# Patient Record
Sex: FEMALE | Race: BLACK OR AFRICAN AMERICAN | NOT HISPANIC OR LATINO | Employment: FULL TIME | URBAN - METROPOLITAN AREA
[De-identification: names, ages, dates, MRNs, and addresses within clinical notes are randomized per-mention and may not be internally consistent; named-entity substitution may affect disease eponyms.]

---

## 2018-09-19 ENCOUNTER — TELEPHONE (OUTPATIENT)
Dept: TRANSPLANT | Facility: CLINIC | Age: 23
End: 2018-09-19

## 2018-09-19 NOTE — TELEPHONE ENCOUNTER
----- Message from Kati Kurtz sent at 9/19/2018  8:57 AM CDT -----  We have the pt recorders and they are now pending review by the referral nurse.  By:Kati Kurtz

## 2018-09-23 ENCOUNTER — DOCUMENTATION ONLY (OUTPATIENT)
Dept: TRANSPLANT | Facility: CLINIC | Age: 23
End: 2018-09-23

## 2018-09-24 ENCOUNTER — TELEPHONE (OUTPATIENT)
Dept: TRANSPLANT | Facility: CLINIC | Age: 23
End: 2018-09-24

## 2018-09-24 NOTE — TELEPHONE ENCOUNTER
Called pt lvm re need for imaging from [place]. Called referring, phone number sent on referral form was wrong number.

## 2018-09-24 NOTE — NURSING
Pt records reviewed.  Pt will be referred to Hepatology due to right subcapsular fluid collection seen along the right hepatic lobe measuring 4.3 x 1.2 x 6.1 cm   Initial referral received  from Dr. Rocael Riojas  Referral letter sent to provider and patient.

## 2018-10-05 ENCOUNTER — TELEPHONE (OUTPATIENT)
Dept: HEPATOLOGY | Facility: CLINIC | Age: 23
End: 2018-10-05

## 2018-10-05 ENCOUNTER — LAB VISIT (OUTPATIENT)
Dept: LAB | Facility: HOSPITAL | Age: 23
End: 2018-10-05
Payer: COMMERCIAL

## 2018-10-05 ENCOUNTER — OFFICE VISIT (OUTPATIENT)
Dept: HEPATOLOGY | Facility: CLINIC | Age: 23
End: 2018-10-05
Payer: COMMERCIAL

## 2018-10-05 ENCOUNTER — DOCUMENTATION ONLY (OUTPATIENT)
Dept: HEPATOLOGY | Facility: CLINIC | Age: 23
End: 2018-10-05

## 2018-10-05 VITALS
HEIGHT: 64 IN | BODY MASS INDEX: 32.29 KG/M2 | RESPIRATION RATE: 18 BRPM | OXYGEN SATURATION: 100 % | HEART RATE: 85 BPM | SYSTOLIC BLOOD PRESSURE: 139 MMHG | WEIGHT: 189.13 LBS | TEMPERATURE: 98 F | DIASTOLIC BLOOD PRESSURE: 79 MMHG

## 2018-10-05 DIAGNOSIS — D18.03 LIVER HEMANGIOMA: Primary | ICD-10-CM

## 2018-10-05 DIAGNOSIS — R93.5 ABNORMAL ABDOMINAL CT SCAN: ICD-10-CM

## 2018-10-05 PROBLEM — K76.9 LIVER LESION: Status: ACTIVE | Noted: 2018-10-05

## 2018-10-05 LAB
ALBUMIN SERPL BCP-MCNC: 3.9 G/DL
ALP SERPL-CCNC: 63 U/L
ALT SERPL W/O P-5'-P-CCNC: 11 U/L
ANION GAP SERPL CALC-SCNC: 8 MMOL/L
AST SERPL-CCNC: 15 U/L
BASOPHILS # BLD AUTO: 0.05 K/UL
BASOPHILS NFR BLD: 0.4 %
BILIRUB SERPL-MCNC: 0.2 MG/DL
BUN SERPL-MCNC: 9 MG/DL
CALCIUM SERPL-MCNC: 9.8 MG/DL
CHLORIDE SERPL-SCNC: 106 MMOL/L
CO2 SERPL-SCNC: 26 MMOL/L
CREAT SERPL-MCNC: 0.9 MG/DL
DIFFERENTIAL METHOD: ABNORMAL
EOSINOPHIL # BLD AUTO: 0 K/UL
EOSINOPHIL NFR BLD: 0.4 %
ERYTHROCYTE [DISTWIDTH] IN BLOOD BY AUTOMATED COUNT: 12.5 %
EST. GFR  (AFRICAN AMERICAN): >60 ML/MIN/1.73 M^2
EST. GFR  (NON AFRICAN AMERICAN): >60 ML/MIN/1.73 M^2
GLUCOSE SERPL-MCNC: 94 MG/DL
HCT VFR BLD AUTO: 39.6 %
HGB BLD-MCNC: 12.1 G/DL
IMM GRANULOCYTES # BLD AUTO: 0.04 K/UL
IMM GRANULOCYTES NFR BLD AUTO: 0.4 %
LYMPHOCYTES # BLD AUTO: 1.7 K/UL
LYMPHOCYTES NFR BLD: 14.6 %
MCH RBC QN AUTO: 28.8 PG
MCHC RBC AUTO-ENTMCNC: 30.6 G/DL
MCV RBC AUTO: 94 FL
MONOCYTES # BLD AUTO: 0.8 K/UL
MONOCYTES NFR BLD: 6.8 %
NEUTROPHILS # BLD AUTO: 8.8 K/UL
NEUTROPHILS NFR BLD: 77.4 %
NRBC BLD-RTO: 0 /100 WBC
PLATELET # BLD AUTO: 302 K/UL
PMV BLD AUTO: 10.9 FL
POTASSIUM SERPL-SCNC: 4.4 MMOL/L
PROT SERPL-MCNC: 7.5 G/DL
RBC # BLD AUTO: 4.2 M/UL
SODIUM SERPL-SCNC: 140 MMOL/L
WBC # BLD AUTO: 11.4 K/UL

## 2018-10-05 PROCEDURE — 86790 VIRUS ANTIBODY NOS: CPT

## 2018-10-05 PROCEDURE — 86704 HEP B CORE ANTIBODY TOTAL: CPT

## 2018-10-05 PROCEDURE — 86706 HEP B SURFACE ANTIBODY: CPT

## 2018-10-05 PROCEDURE — 86803 HEPATITIS C AB TEST: CPT

## 2018-10-05 PROCEDURE — 3008F BODY MASS INDEX DOCD: CPT | Mod: CPTII,S$GLB,, | Performed by: NURSE PRACTITIONER

## 2018-10-05 PROCEDURE — 99204 OFFICE O/P NEW MOD 45 MIN: CPT | Mod: S$GLB,,, | Performed by: NURSE PRACTITIONER

## 2018-10-05 PROCEDURE — 36415 COLL VENOUS BLD VENIPUNCTURE: CPT

## 2018-10-05 PROCEDURE — 80053 COMPREHEN METABOLIC PANEL: CPT

## 2018-10-05 PROCEDURE — 85025 COMPLETE CBC W/AUTO DIFF WBC: CPT

## 2018-10-05 PROCEDURE — 99999 PR PBB SHADOW E&M-EST. PATIENT-LVL IV: CPT | Mod: PBBFAC,,, | Performed by: NURSE PRACTITIONER

## 2018-10-05 NOTE — NURSING
Disc from [place] of CT 6/18 and 9/18 to film library for uploading. Requested STAT upload to be ready for IR conference.    SCC

## 2018-10-05 NOTE — PATIENT INSTRUCTIONS
1. All labs today  2. Will present your imaging to our interventional radiology conference to discuss plan for fluid collection  3. Will let you know

## 2018-10-05 NOTE — LETTER
October 5, 2018      Rocael Riojas MD  [address]  [address]  [facility]  [facility] [address]           [facility] - Hepatology  [address]  [place] [address]  Phone: [phone]  Fax: [phone]          Patient: Nimco Ríos   MR Number: [id number]   YOB: 1995   Date of Visit: 10/5/2018       Dear Dr. Rocael Riojas:    Thank you for referring Nimco Ríos to me for evaluation. Attached you will find relevant portions of my assessment and plan of care.    If you have questions, please do not hesitate to call me. I look forward to following Nimco Ríos along with you.    Sincerely,    Holley Dacosta, NP    Enclosure  CC:  No Recipients    If you would like to receive this communication electronically, please contact [email or web address] or [phone] to request more information on [facility] [employer] access.    For providers and/or their staff who would like to refer a patient to [facility], please contact us through our one-stop-shop provider referral line, [facility], at [phone].    If you feel you have received this communication in error or would no longer like to receive these types of communications, please e-mail [email or web address]

## 2018-10-05 NOTE — PROGRESS NOTES
"[facility] VISIT NEW PT NOTE    REFERRING PROVIDER:  Dr. Rocael Riojas    CHIEF COMPLAINT: fluid collection near right hepatic lobe on outside imaging     HPI: This is a 23 y.o. Black or  female with PMH noted below, presenting for evaluation of fluid collection near right hepatic lobe on outside imaging     S/p cholecystectomy 7/6/18 Dr. Colette Price [facility] for RUQ abd pain     Intermittent fevers for past ~2-3 months    Abd pain x" years", RUQ pain intermittently that radiates to back and shoulder x1 year, reports mild worsening since lap mao    Lab Results   Component Value Date    ALT 20 08/27/2018    AST 21 08/27/2018    ALKPHOS 55 08/27/2018    BILITOT 0.2 08/27/2018    ALBUMIN 4.2 08/27/2018     08/27/2018     CT abd/pelvis 9/4/18 showed   1. A 11 mm hemangioma in the lateral  segment left hepatic lobe  2. right subcapsular fluid collection seen along the right hepatic lobe measuring 4.3 x 1.2 x 6.1 cm which has increased in size since the prior exam  3. Soft tissue nodule seen in the right paracolic gutter in the lower abdomen/upper   pelvis measuring 1.6 x 1.5 cm which has increased in size since the prior exam    CT abd done 6/5/18  1. The liver, spleen, pancreas, adrenal glands, and kidneys are normal in appearance.    Occupation -  at [employer]    Denies jaundice, dark urine, abdominal distention, hematemesis, melena, slowed mentation. No abnormal skin rashes. No generalized joint or muscle pain.      Review of patient's allergies indicates:  No Known Allergies    Current Outpatient Medications on File Prior to Visit   Medication Sig Dispense Refill    ondansetron (ZOFRAN-ODT) 4 MG TbDL Take 1-2 tablets (4-8 mg total) by mouth every 8 (eight) hours as needed. 15 tablet 0     No current facility-administered medications on file prior to visit.        PMHX:  has a past medical history of Endometriosis, HTN (hypertension), and Liver lesion.    PSHX:  has a " "past surgical history that includes Appendectomy; Cholecystectomy; and  section.    FAMILY HISTORY: Negative for liver disease, reviewed in EPIC    SOCIAL HISTORY:   Social History     Tobacco Use   Smoking Status Never Smoker   Smokeless Tobacco Never Used       Social History     Substance and Sexual Activity   Alcohol Use No    Frequency: Never       Social History     Substance and Sexual Activity   Drug Use No       ROS:   GENERAL: + intermittent fever x2-3 months, denies chills, weight loss/gain, + intermittent fatigue  HEENT: + chronic headaches/migranes, denies dizziness, vision/hearing changes  CARDIOVASCULAR: Denies chest pain, palpitations, + intermittent BLE edema  RESPIRATORY: Denies dyspnea, cough  GI: + RUQ abd pain, nausea, intermittent vomiting.  Denies rectal bleeding,  No change in bowel pattern or color  : Denies dysuria, hematuria   SKIN: Denies rash, itching   NEURO: Denies confusion, memory loss, or mood changes  PSYCH: Denies depression or anxiety  HEME/LYMPH: Denies easy bruising or bleeding    PHYSICAL EXAM:   Friendly Black or  female, in no acute distress; alert and oriented to person, place and time  VITALS: /79 (BP Location: Left arm, Patient Position: Sitting)   Pulse 85   Temp 97.5 °F (36.4 °C) (Oral)   Resp 18   Ht 5' 4" (1.626 m)   Wt 85.8 kg (189 lb 2.5 oz)   SpO2 100%   BMI 32.47 kg/m²   HENT: Normocephalic, without obvious abnormality. Oral mucosa pink and moist. Dentition good.  EYES: Sclerae anicteric. No conjunctival pallor.   NECK: Supple. No masses or cervical adenopathy.  CARDIOVASCULAR: Regular rate and rhythm. No murmurs.  RESPIRATORY: Normal respiratory effort. BBS CTA. No wheezes or crackles.  GI: Soft, + mildly tender RUQ, non-distended. No hepatosplenomegaly. No masses palpable. No ascites.  EXTREMITIES:  No clubbing, cyanosis or edema.  SKIN: Warm and dry. No jaundice. No rashes noted to exposed skin. No telangectasias noted. " No palmar erythema.  NEURO:  Normal gait. No asterixis.  PSYCH:  Memory intact. Thought and speech pattern appropriate. Behavior normal. No depression or anxiety noted.    RECENT LABS:    Hepatitis A and B immunity markers:    No results found for: HEPAIGG    No results found for: HEPBSAG  No results found for: HEPBCAB  No results found for: HEPBSAB    There is no immunization history on file for this patient.    Labs:  Lab Results   Component Value Date    WBC 9.90 08/27/2018    HGB 12.2 08/27/2018    HCT 37.3 08/27/2018     08/27/2018     08/27/2018    K 4.0 08/27/2018    CREATININE 0.88 08/27/2018    ALT 20 08/27/2018    AST 21 08/27/2018    ALKPHOS 55 08/27/2018    BILITOT 0.2 08/27/2018    ALBUMIN 4.2 08/27/2018       DIAGNOSTIC STUDIES:  ABD. U/S-  None   CT SCAN-  Done 9/4/18  FINDINGS:  The lung bases are clear. The abdominal aorta is normal caliber throughout. The pancreas, spleen, adrenal glands, left kidney, and urinary bladder are unremarkable. The left ovarian cyst seen on the prior exam has resolved. The urinary bladder   and adnexa are unremarkable. There is a small simple cyst in the right kidney, better visualized on the prior exam and a small focal cortical scar seen in the anterior aspect of the midportion the right kidney. There is a 11 mm hemangioma in the lateral   segment left hepatic lobe.    There is a right subcapsular fluid collection seen along the right hepatic lobe measuring 4.3 x 1.2 x 6.1 cm which has increased in size since the prior exam. There is a soft tissue nodule seen in the right paracolic gutter in the lower abdomen/upper   pelvis measuring 1.6 x 1.5 cm which has increased in size since the prior exam. There is a small amount of free fluid in the pelvis. There is questionable mural thickening of the terminal ileum. The patient has had appendectomy and cholecystectomy.    The osseous structures are unremarkable.      IMPRESSION:   1. Right hepatic lobe subcapsular  fluid collection measuring 6.1 x 4.3 x 1.2 cm and soft tissue nodule in the right paracolic gutter in the lower abdomen/upper pelvis measuring 1.6 x 1.5 cm. This can be seen with endometriosis, but can also be seen with   peritoneal metastatic disease. Tissue sampling at laparoscopy could be performed for further evaluation.  2. Questionable mural thickening of the terminal ileum without evidence for bowel obstruction. An MR enterography or CT enterography could be performed for further evaluation.    CT SCAN 6/5/18  FINDINGS: Motion artifact is present of the upper abdomen. The visualized lung bases are clear.  The liver, spleen, pancreas, adrenal glands, and kidneys are normal in appearance.  Small bowel is nondilated. The terminal ileum in the right lower quadrant   demonstrate circumferential wall thickening. This is similar in appearance the prior CT of 2015. There is no evidence of free fluid or lymphadenopathy.  Osseous structures are unremarkable.  A 2.4 x 4.1 cm cystic structures identified in the right   adnexal region. The patient was portion of the uterus and left adnexa are within normal limits. Appendix not visualized. Appendix is surgically absent by history.. Prominent stool is noted throughout the colon without abnormal distention. Gallbladder   contracted and grossly unremarkable. Portal vein is patent.    IMPRESSION:  1. Wall thickening of the terminal ileum worrisome for terminal ileitis and possible inflammatory bowel disease. No evidence of bowel obstruction.  2. Right ovarian cyst of 4.1 cm.   3. Possible constipation.    MRI-   none  LIVER BIOPSY-    none       ASSESSMENT:  23 y.o. Black or  female with:  1.  Liver hemangioma  --  11 mm hemangioma in the lateral  segment left hepatic lobe    2. Right subcapsular fluid collection seen along the right hepatic lobe   -- measuring 4.3 x 1.2 x 6.1 cm which has increased in size since the prior exam\    3. Soft tissue nodule seen in  the right paracolic gutter in the lower abdomen/upper   pelvis   -- pt established with GI, reports visit 3 days ago with plan for w/u for tissue nodule     EDUCATION:     Discussed with patient will present outside scan CD for upload and presentation to weekly interventional radiology conference    Likely that abd pain is not associated with abnormality on imaging, as RUQ pain has been present for >1 year, normal CT scan of liver 6/2018. Will determine in radiology conference if any further imaging or intervention is needed (either with our department or outside providers).        PLAN:  1. Labs today   2. Outside scans submitted for review in IR conference  3. Continue f/u with OB-GYN for plan for pelvic lesion  4. Continue f/u with GI for abd pain w/u    5. Will determine need for f/u after interventional radiology presentation    Thank you for allowing me to participate in the care of Mayamarkos Khushbu Dacosta, NP-C    CC'ed note to:   Dr. Riojas

## 2018-10-05 NOTE — PROGRESS NOTES
I have personally performed a face to face diagnostic evaluation on this patient. I have reviewed and agree with today's findings and the care plan outlined by Holley Dacosta NP      My findings are as follows:  Patient presents from [facility], with normal CT un June 2018 during abd pain eval, but had abnormal CT on 9/4/18 from [facility]. Showed 11 mm hemangioma and right subcapsular fluid collection 4.3 x 1.2 x 6.1 cm, increased in size (date unknown).  Also has a soft-tissue nodule in the right paracolic gutter in upper pelvis. Has h/o endometriosis. Had lap cholecystectomy in July 2018 for RUQ pain by Dr. Price at [facility], which has not improved, but rather worsened abd pain, after surgery.       Currently, has RUQ pain x 1 yr, radiates to back and right shoulder, generalized abd pain (prob from endometriosis) x many yrs. No fevers.     CBC, CMP completely normal.     Review both CTs in IR conf.        she will return to Holley Dacosta NP  for follow-up.

## 2018-10-05 NOTE — TELEPHONE ENCOUNTER
"Patient: Nimco Ríos       MRN: [id number]      : 1995     Age: 23 y[address] [place] [address]    Provider: Hepatologist Valentin Shay seen with GAIL Dacosta NP    Urgency of review: non-urgent    Patient Transplant Status: Not a candidate    Reason for presentation: Other fluid collection?    Clinical Summary: 23 y.o referred for evaluation of right subcapsular fluid collection seen along the right hepatic lobe seen on CT 2018    Of note: CT 2018 reports normal liver, no fluid collection     S/p cholecystectomy 18 Dr. Colette Price [facility] for RUQ abd pain     Intermittent fevers for past ~2-3 months    Abd pain x" years", RUQ pain intermittently that radiates to back and shoulder x1 year, reports mild worsening since lap mao    Imaging to be reviewed: CT scan 2018    HCC Treatment History:     ABO:     Platelets:   Lab Results   Component Value Date/Time     10/05/2018 03:00 PM     Creatinine:   Lab Results   Component Value Date/Time    CREATININE 0.88 2018 04:13 PM     Bilirubin:   Lab Results   Component Value Date/Time    BILITOT 0.2 2018 04:13 PM     AFP Last 3 each if available: No results found for: AFP, EXTAFP    MELD: Computed MELD-Na score unavailable. Necessary lab results were not found in the last year.  Computed MELD score unavailable. Necessary lab results were not found in the last year.    Plan:     Follow-up Provider:   "

## 2018-10-08 ENCOUNTER — PATIENT MESSAGE (OUTPATIENT)
Dept: HEPATOLOGY | Facility: CLINIC | Age: 23
End: 2018-10-08

## 2018-10-08 DIAGNOSIS — D18.03 LIVER HEMANGIOMA: Primary | ICD-10-CM

## 2018-10-08 DIAGNOSIS — Z23 NEED FOR HEPATITIS A AND B VACCINATION: ICD-10-CM

## 2018-10-08 DIAGNOSIS — R93.5 ABNORMAL ABDOMINAL CT SCAN: ICD-10-CM

## 2018-10-08 LAB
HBV CORE AB SERPL QL IA: NEGATIVE
HBV SURFACE AB SER-ACNC: NEGATIVE M[IU]/ML
HCV AB SERPL QL IA: NEGATIVE
HEPATITIS A ANTIBODY, IGG: NEGATIVE

## 2018-10-08 NOTE — TELEPHONE ENCOUNTER
"Patient: Nimco Ríos       MRN: [id number]      : 1995     Age: 23 y.o.  [address] [place] [address]    Provider: Hepatologist Valentin Shay seen with GAIL Dacosta NP    Urgency of review: non-urgent    Patient Transplant Status: Not a candidate    Reason for presentation: Other fluid collection?    Clinical Summary: 23 y.o referred for evaluation of right subcapsular fluid collection seen along the right hepatic lobe seen on CT 2018    Of note: CT 2018 reports normal liver, no fluid collection     S/p cholecystectomy 18 Dr. Colette Price [facility] for RUQ abd pain     Intermittent fevers for past ~2-3 months    Abd pain x" years", RUQ pain intermittently that radiates to back and shoulder x1 year, reports mild worsening since lap mao    Imaging to be reviewed: CT scan 2018    HCC Treatment History:     ABO:     Platelets:   Lab Results   Component Value Date/Time     10/05/2018 03:00 PM     Creatinine:   Lab Results   Component Value Date/Time    CREATININE 0.9 10/05/2018 03:00 PM     Bilirubin:   Lab Results   Component Value Date/Time    BILITOT 0.2 10/05/2018 03:00 PM     AFP Last 3 each if available: No results found for: AFP, EXTAFP    MELD: Computed MELD-Na score unavailable. Necessary lab results were not found in the last year.  Computed MELD score unavailable. Necessary lab results were not found in the last year.    Plan: Both CTs show loculated fluid along the posterior right hepatic lobe, more prominent on 2018. This is non specific, we could aspirate and send for labs given her h/o fevers. Metallic density in RLQ, probably from prior appendectomy, although it appears to be within TI region.     Fluid collection has increased in size post surgery.  IR to aspirate an send fluid for testing.    Follow-up Provider: Holley Dacosta NP  "

## 2018-10-09 ENCOUNTER — PATIENT MESSAGE (OUTPATIENT)
Dept: HEPATOLOGY | Facility: CLINIC | Age: 23
End: 2018-10-09

## 2018-10-16 ENCOUNTER — CONFERENCE (OUTPATIENT)
Dept: TRANSPLANT | Facility: CLINIC | Age: 23
End: 2018-10-16

## 2018-10-17 ENCOUNTER — PATIENT MESSAGE (OUTPATIENT)
Dept: HEPATOLOGY | Facility: CLINIC | Age: 23
End: 2018-10-17

## 2018-10-18 ENCOUNTER — TELEPHONE (OUTPATIENT)
Dept: HEPATOLOGY | Facility: CLINIC | Age: 23
End: 2018-10-18

## 2018-10-18 DIAGNOSIS — R93.5 ABNORMAL ABDOMINAL CT SCAN: Primary | ICD-10-CM

## 2018-10-18 DIAGNOSIS — R18.8 ABDOMINAL FLUID COLLECTION: ICD-10-CM

## 2018-10-18 NOTE — TELEPHONE ENCOUNTER
Attempted to call pt to discuss IR conference recommendations to drain fluid collection, left message for pt to return my call or discuss via [facility]    Will send fluid for Cell count with diff, Gram stain, bacterial culture aerobic and anaerobic, fungal culture, afb smear/culture

## 2018-10-18 NOTE — TELEPHONE ENCOUNTER
Discussed IR conference recommendations of fluid aspiration. Pt wishes to proceed. See below for all labs needed for fluid, orders in for labs    Thanks

## 2018-10-18 NOTE — TELEPHONE ENCOUNTER
See telephone note about scheduling IR procedure for aspiration with labs to be sent of fluid     Thanks

## 2018-10-22 ENCOUNTER — PATIENT MESSAGE (OUTPATIENT)
Dept: TRANSPLANT | Facility: CLINIC | Age: 23
End: 2018-10-22

## 2018-10-22 DIAGNOSIS — R18.8 ABDOMINAL FLUID COLLECTION: Primary | ICD-10-CM

## 2018-10-24 DIAGNOSIS — R18.8 ABDOMINAL FLUID COLLECTION: Primary | ICD-10-CM

## 2018-10-25 ENCOUNTER — HOSPITAL ENCOUNTER (OUTPATIENT)
Facility: HOSPITAL | Age: 23
Discharge: HOME | End: 2018-10-26
Attending: RADIOLOGY | Admitting: RADIOLOGY
Payer: COMMERCIAL

## 2018-10-25 VITALS
OXYGEN SATURATION: 100 % | WEIGHT: 180 LBS | BODY MASS INDEX: 30.73 KG/M2 | RESPIRATION RATE: 16 BRPM | SYSTOLIC BLOOD PRESSURE: 136 MMHG | HEIGHT: 64 IN | DIASTOLIC BLOOD PRESSURE: 86 MMHG | TEMPERATURE: 98 F | HEART RATE: 84 BPM

## 2018-10-25 DIAGNOSIS — R18.8 ABDOMINAL FLUID COLLECTION: ICD-10-CM

## 2018-10-25 LAB
APPEARANCE FLD: NORMAL
B-HCG UR QL: NEGATIVE
BODY FLD TYPE: NORMAL
COLOR FLD: NORMAL
CTP QC/QA: YES
GRAM STN SPEC: NORMAL
GRAM STN SPEC: NORMAL
LYMPHOCYTES NFR FLD MANUAL: 2 %
MONOS+MACROS NFR FLD MANUAL: 10 %
NEUTROPHILS NFR FLD MANUAL: 88 %
WBC # FLD: NORMAL /CU MM

## 2018-10-25 PROCEDURE — 87070 CULTURE OTHR SPECIMN AEROBIC: CPT

## 2018-10-25 PROCEDURE — 81025 URINE PREGNANCY TEST: CPT | Performed by: FAMILY MEDICINE

## 2018-10-25 PROCEDURE — 87015 SPECIMEN INFECT AGNT CONCNTJ: CPT

## 2018-10-25 PROCEDURE — 89051 BODY FLUID CELL COUNT: CPT

## 2018-10-25 PROCEDURE — 87075 CULTR BACTERIA EXCEPT BLOOD: CPT

## 2018-10-25 PROCEDURE — 87116 MYCOBACTERIA CULTURE: CPT

## 2018-10-25 PROCEDURE — 87102 FUNGUS ISOLATION CULTURE: CPT

## 2018-10-25 PROCEDURE — 63600175 PHARM REV CODE 636 W HCPCS: Performed by: FAMILY MEDICINE

## 2018-10-25 PROCEDURE — 87206 SMEAR FLUORESCENT/ACID STAI: CPT

## 2018-10-25 PROCEDURE — 87205 SMEAR GRAM STAIN: CPT

## 2018-10-25 RX ORDER — FENTANYL CITRATE 50 UG/ML
50 INJECTION, SOLUTION INTRAMUSCULAR; INTRAVENOUS
Status: DISCONTINUED | OUTPATIENT
Start: 2018-10-25 | End: 2018-10-26 | Stop reason: HOSPADM

## 2018-10-25 RX ORDER — SODIUM CHLORIDE 9 MG/ML
500 INJECTION, SOLUTION INTRAVENOUS ONCE
Status: DISCONTINUED | OUTPATIENT
Start: 2018-10-25 | End: 2018-10-26 | Stop reason: HOSPADM

## 2018-10-25 RX ORDER — MIDAZOLAM HYDROCHLORIDE 1 MG/ML
1 INJECTION INTRAMUSCULAR; INTRAVENOUS
Status: DISCONTINUED | OUTPATIENT
Start: 2018-10-25 | End: 2018-10-26 | Stop reason: HOSPADM

## 2018-10-25 RX ADMIN — FENTANYL CITRATE 50 MCG: 50 INJECTION, SOLUTION INTRAMUSCULAR; INTRAVENOUS at 10:10

## 2018-10-25 RX ADMIN — MIDAZOLAM HYDROCHLORIDE 1 MG: 1 INJECTION, SOLUTION INTRAMUSCULAR; INTRAVENOUS at 10:10

## 2018-10-25 NOTE — PROGRESS NOTES
Aspiration completed, pt tolerated well. No apparent distress noted. Band aid applied CDI. Labs collected and sent. Pt to be transferred to ROCU, report to be given at bedside.

## 2018-10-25 NOTE — H&P
Radiology History & Physical      SUBJECTIVE:     Chief Complaint: Perihepatic fluid collection    History of Present Illness:  Nimco Ríos is a 23 y.o. female who presents for fluid collection aspiration and possible drainage.    Past Medical History:   Diagnosis Date    Endometriosis     HTN (hypertension)     Liver lesion      Past Surgical History:   Procedure Laterality Date    APPENDECTOMY  2015     SECTION  2009    CHOLECYSTECTOMY  2018    HERNIA REPAIR  2018       Home Meds:   Prior to Admission medications    Medication Sig Start Date End Date Taking? Authorizing Provider   ondansetron (ZOFRAN-ODT) 4 MG TbDL Take 1-2 tablets (4-8 mg total) by mouth every 8 (eight) hours as needed. 18  Yes Julianne Duke MD   ranitidine (ZANTAC) 150 MG tablet Take 150 mg by mouth once daily. 18  Yes Historical Provider, MD     Anticoagulants/Antiplatelets: no anticoagulation    Allergies: Review of patient's allergies indicates:  No Known Allergies  Sedation History:  no adverse reactions    Review of Systems:   Hematological: no known coagulopathies  Respiratory: no shortness of breath  Cardiovascular: no chest pain  Gastrointestinal: no abdominal pain  Genito-Urinary: no dysuria  Musculoskeletal: negative  Neurological: no TIA or stroke symptoms         OBJECTIVE:     Vital Signs (Most Recent)  Temp: 98.6 °F (37 °C) (10/25/18 0815)  Pulse: 80 (10/25/18 0815)  Resp: 16 (10/25/18 0815)  BP: 127/75 (10/25/18 0815)  SpO2: 99 % (10/25/18 0815)    Physical Exam:  ASA: 2  Mallampati: 2    General: no acute distress  Mental Status: alert and oriented to person, place and time  HEENT: normocephalic, atraumatic  Chest: unlabored breathing  Heart: regular heart rate  Abdomen: nondistended  Extremity: moves all extremities    Laboratory  Lab Results   Component Value Date    INR 1.0 10/25/2018       Lab Results   Component Value Date    WBC 11.40 10/05/2018    HGB 12.1  "10/05/2018    HCT 39.6 10/05/2018    MCV 94 10/05/2018     10/05/2018      Lab Results   Component Value Date    GLU 94 10/05/2018     10/05/2018    K 4.4 10/05/2018     10/05/2018    CO2 26 10/05/2018    BUN 9 10/05/2018    CREATININE 0.9 10/05/2018    CALCIUM 9.8 10/05/2018    ALT 11 10/05/2018    AST 15 10/05/2018    ALBUMIN 3.9 10/05/2018    BILITOT 0.2 10/05/2018       ASSESSMENT/PLAN:     Sedation Plan: moderate  Patient will undergo Perihepatic fluid collection aspiration and possible drainage.    Calderon Michel MD ([facility])  Radiology PGY-5  [phone]      "

## 2018-10-25 NOTE — PROGRESS NOTES
Pt arrived to ROCU bed 1 for post aspiration recovery. Report received fromHeather. Pt denies pain/discomfort. Dressing CDI. VSS. No acute events. See flow sheets for post procedure monitoring.

## 2018-10-25 NOTE — NURSING
Discharge instructions given. Family at bedside. Verbalized understand. PIV removed. Catheter intact, site without swelling. Instructed on incision site care and symptoms that require MD attention.

## 2018-10-25 NOTE — PLAN OF CARE
Patient had been prepared for procedure. All clothing has been removed and placed in patient's labeled belongings bag, underwear left in place. All jewelry has been removed, except 1 nipple piercing that has been covered with paper tape. Glasses to remain in place Patient has been instructed to remove all metal from hair. Patient verbalized understanding.    Mother to take patient's purse and belongings bag during procedure.

## 2018-10-25 NOTE — PROCEDURES
Radiology Post-Procedure Note    Pre Op Diagnosis: Perihepatic collection  Post Op Diagnosis: Same    Procedure: US guided aspiration    Procedure performed by: Martin Hoffman MD    Written Informed Consent Obtained: Yes  Specimen Removed: YES 6mL bloody/cloudy fluid  Estimated Blood Loss: Minimal    Findings:   Using US guidance, the perihepatic collection was accessed with a micropuncture needle.  6mL bloody/cloudy fluid was removed.  Follow-up images demonstrate resolution of the collection.  No complications.    Patient tolerated procedure well.    Martin Hoffman MD  Diagnostic and Interventional Radiologist  Department of Radiology  Pager: [phone]

## 2018-10-25 NOTE — DISCHARGE INSTRUCTIONS
For scheduling: Call Karolina at [phone]    For questions or concerns call: TERESE MON-FRI 8 AM- 5PM [phone]. Radiology resident on call [phone].    For immediate concerns that are not emergent, you may call our radiology clinic at: [phone]

## 2018-10-26 NOTE — DISCHARGE SUMMARY
Radiology Discharge Summary      Hospital Course: No complications    Admit Date: 10/25/2018  Discharge Date: 10/25/2018    Instructions Given to Patient: Yes  Diet: Resume prior diet  Activity: activity as tolerated    Description of Condition on Discharge: Stable  Vital Signs (Most Recent): Temp: 97.7 °F (36.5 °C) (10/25/18 1030)  Pulse: 84 (10/25/18 1130)  Resp: 16 (10/25/18 1130)  BP: 136/86 (10/25/18 1130)  SpO2: 100 % (10/25/18 1130)    Discharge Disposition: Home    Discharge Diagnosis: Perihepatic fluid collection s/p aspiration     Follow-up: PRROBERTH Hoffman MD  Diagnostic and Interventional Radiologist  Department of Radiology  Pager: [phone]

## 2018-10-29 LAB — BACTERIA SPEC AEROBE CULT: NO GROWTH

## 2018-11-01 LAB — BACTERIA SPEC ANAEROBE CULT: NORMAL

## 2018-11-06 ENCOUNTER — PATIENT MESSAGE (OUTPATIENT)
Dept: TRANSPLANT | Facility: CLINIC | Age: 23
End: 2018-11-06

## 2018-11-13 ENCOUNTER — PATIENT MESSAGE (OUTPATIENT)
Dept: HEPATOLOGY | Facility: CLINIC | Age: 23
End: 2018-11-13

## 2018-11-14 ENCOUNTER — PATIENT MESSAGE (OUTPATIENT)
Dept: HEPATOLOGY | Facility: CLINIC | Age: 23
End: 2018-11-14

## 2018-11-19 ENCOUNTER — OFFICE VISIT (OUTPATIENT)
Dept: INFECTIOUS DISEASES | Facility: CLINIC | Age: 23
End: 2018-11-19
Payer: COMMERCIAL

## 2018-11-19 ENCOUNTER — PATIENT MESSAGE (OUTPATIENT)
Dept: HEPATOLOGY | Facility: CLINIC | Age: 23
End: 2018-11-19

## 2018-11-19 VITALS
DIASTOLIC BLOOD PRESSURE: 82 MMHG | SYSTOLIC BLOOD PRESSURE: 131 MMHG | TEMPERATURE: 99 F | HEART RATE: 87 BPM | WEIGHT: 185.19 LBS | HEIGHT: 64 IN | BODY MASS INDEX: 31.62 KG/M2

## 2018-11-19 DIAGNOSIS — K75.0 LIVER ABSCESS: Primary | ICD-10-CM

## 2018-11-19 DIAGNOSIS — R18.8 ABDOMINAL FLUID COLLECTION: ICD-10-CM

## 2018-11-19 PROCEDURE — 99205 OFFICE O/P NEW HI 60 MIN: CPT | Mod: S$GLB,,, | Performed by: INTERNAL MEDICINE

## 2018-11-19 PROCEDURE — 99999 PR PBB SHADOW E&M-EST. PATIENT-LVL III: CPT | Mod: PBBFAC,,, | Performed by: INTERNAL MEDICINE

## 2018-11-19 PROCEDURE — 3008F BODY MASS INDEX DOCD: CPT | Mod: CPTII,S$GLB,, | Performed by: INTERNAL MEDICINE

## 2018-11-19 RX ORDER — HYDROCODONE BITARTRATE AND ACETAMINOPHEN 7.5; 325 MG/1; MG/1
1 TABLET ORAL
COMMUNITY
Start: 2018-11-09 | End: 2018-11-19

## 2018-11-19 RX ORDER — PANTOPRAZOLE SODIUM 40 MG/1
40 TABLET, DELAYED RELEASE ORAL
COMMUNITY
Start: 2018-11-09 | End: 2021-07-27

## 2018-11-19 NOTE — PROGRESS NOTES
"Subjective:      Patient ID: Nimco Ríos is a 23 y.o. female.    Chief Complaint:  Consult re stone-hepatic fluid collection from Holley Dacosta NP in hepatology      History of Present Illness    Pt has been not feeling well for about 2 months. She has been having intermittent fevers (T max 103, fever occurs a couple of times weekly) and some RUQ abdominal pain. She has been evaluated by Dr. Rocael Riojas ([facility]) and at [facility] (last night for abd pain). Had CT of abdomen which I do not have access to. No [facility] labs in last 6 weeks. Records are fragmented which is part of the problem that I explained to pt. We will need to get [facility] and [facility] records. She has an appt to see GI again on Friday.  There is a brief comment in ER report that she has Crohn's disease, not verified elsewhere in her [facility] record. She had an aspiration by IR of "perihepatic fluid collection" on 10/ 25 and cultures were submitted for routine/anaerobes/AFB and fungus (all negative to date). WBC on fluid was 52K, 88% segs). Denies any travel out of country or significant animal exposure. Lives in [place] with her mother, sister and son, all healthy.    Review of Systems   Constitution: Positive for malaise/fatigue, weight gain and weight loss. Negative for chills, decreased appetite, fever, weakness and night sweats.   HENT: Negative for congestion, ear pain, hearing loss, hoarse voice, sore throat and tinnitus.    Eyes: Negative for blurred vision, redness and visual disturbance.   Cardiovascular: Positive for chest pain and leg swelling. Negative for palpitations.   Respiratory: Negative for cough, hemoptysis, shortness of breath and sputum production.    Hematologic/Lymphatic: Negative for adenopathy. Does not bruise/bleed easily.   Skin: Negative for dry skin, itching, rash and suspicious lesions.   Musculoskeletal: Positive for myalgias. Negative for back pain, joint pain and neck pain.   Gastrointestinal: Positive " for abdominal pain, constipation, diarrhea, heartburn, nausea and vomiting.   Genitourinary: Negative for dysuria, flank pain, frequency, hematuria, hesitancy and urgency.   Neurological: Positive for headaches. Negative for dizziness, numbness and paresthesias.   Psychiatric/Behavioral: Negative for depression and memory loss. The patient does not have insomnia and is not nervous/anxious.      Objective:   Physical Exam   Constitutional: She is oriented to person, place, and time. She appears well-developed and well-nourished.   HENT:   Head: Normocephalic and atraumatic.   Mouth/Throat: Oropharynx is clear and moist.   Eyes: EOM are normal. Pupils are equal, round, and reactive to light.   Neck: Normal range of motion. Neck supple. No thyromegaly present.   Cardiovascular: Normal rate, regular rhythm and normal heart sounds. Exam reveals no gallop.   No murmur heard.  Pulmonary/Chest: Effort normal and breath sounds normal. No stridor. No respiratory distress. She has no wheezes. She has no rales.   Abdominal: Soft. She exhibits no distension and no mass. There is tenderness. There is no rebound and no guarding.   Tender in RUQ   Musculoskeletal: Normal range of motion.   Lymphadenopathy:     She has no cervical adenopathy.   Neurological: She is alert and oriented to person, place, and time.   Skin: Skin is warm and dry.   Psychiatric: She has a normal mood and affect. Her behavior is normal. Thought content normal.   Vitals reviewed.    Assessment:       1. ?  perihepatic abscess with intermittent fevers   2. Abdominal fluid collection      3.  Questionable hx of Crohns???    Plan:           Blood cultures, E histolytica serology, CBC, CMP, ESR, CRP, abdominal US  Get [facility] and [facility]  See back after above

## 2018-11-23 ENCOUNTER — HOSPITAL ENCOUNTER (OUTPATIENT)
Dept: RADIOLOGY | Facility: HOSPITAL | Age: 23
Discharge: HOME | End: 2018-11-23
Attending: INTERNAL MEDICINE
Payer: COMMERCIAL

## 2018-11-23 DIAGNOSIS — K75.0 LIVER ABSCESS: ICD-10-CM

## 2018-11-23 PROCEDURE — 76700 US EXAM ABDOM COMPLETE: CPT | Mod: TC

## 2018-11-23 PROCEDURE — 76700 US EXAM ABDOM COMPLETE: CPT | Mod: 26,,, | Performed by: RADIOLOGY

## 2018-11-27 LAB — FUNGUS SPEC CULT: NORMAL

## 2018-12-27 LAB
ACID FAST MOD KINY STN SPEC: NORMAL
MYCOBACTERIUM SPEC QL CULT: NORMAL

## 2019-02-05 ENCOUNTER — TELEPHONE (OUTPATIENT)
Dept: TRANSPLANT | Facility: CLINIC | Age: 24
End: 2019-02-05

## 2019-02-05 NOTE — TELEPHONE ENCOUNTER
No additional Hepatology follow-up needed at this time    ----- Message from Holley Dacosta NP sent at 2/5/2019  7:55 AM CST -----  Regarding: RE: ??f/u  No need for f/u. Pt was instructed to notify us of any future issues in case needs further intervention but no further intervention or need for hepatology f/u at this time     Holley    ----- Message -----  From: Norma Dunlap  Sent: 2/4/2019  11:43 PM  To: Hazel Pizano RN, Holley Dacosta NP  Subject: ??f/u                                            Last seen by Holley and cyst aspiration 10/2018.      Please advise on follow-up

## 2019-12-05 ENCOUNTER — TELEPHONE (OUTPATIENT)
Dept: ORTHOPEDICS | Facility: CLINIC | Age: 24
End: 2019-12-05

## 2021-03-30 ENCOUNTER — LAB VISIT (OUTPATIENT)
Dept: LAB | Facility: OTHER | Age: 26
End: 2021-03-30
Payer: MEDICAID

## 2021-03-30 DIAGNOSIS — Z20.822 ENCOUNTER FOR LABORATORY TESTING FOR COVID-19 VIRUS: ICD-10-CM

## 2021-03-30 PROCEDURE — U0003 INFECTIOUS AGENT DETECTION BY NUCLEIC ACID (DNA OR RNA); SEVERE ACUTE RESPIRATORY SYNDROME CORONAVIRUS 2 (SARS-COV-2) (CORONAVIRUS DISEASE [COVID-19]), AMPLIFIED PROBE TECHNIQUE, MAKING USE OF HIGH THROUGHPUT TECHNOLOGIES AS DESCRIBED BY CMS-2020-01-R: HCPCS | Performed by: NURSE PRACTITIONER

## 2021-03-31 LAB — SARS-COV-2 RNA RESP QL NAA+PROBE: NOT DETECTED

## 2021-04-27 ENCOUNTER — LAB VISIT (OUTPATIENT)
Dept: LAB | Facility: OTHER | Age: 26
End: 2021-04-27
Payer: MEDICAID

## 2021-04-27 DIAGNOSIS — Z20.822 ENCOUNTER FOR LABORATORY TESTING FOR COVID-19 VIRUS: ICD-10-CM

## 2021-04-27 PROCEDURE — U0003 INFECTIOUS AGENT DETECTION BY NUCLEIC ACID (DNA OR RNA); SEVERE ACUTE RESPIRATORY SYNDROME CORONAVIRUS 2 (SARS-COV-2) (CORONAVIRUS DISEASE [COVID-19]), AMPLIFIED PROBE TECHNIQUE, MAKING USE OF HIGH THROUGHPUT TECHNOLOGIES AS DESCRIBED BY CMS-2020-01-R: HCPCS | Performed by: NURSE PRACTITIONER

## 2021-04-28 LAB — SARS-COV-2 RNA RESP QL NAA+PROBE: NOT DETECTED

## 2021-05-12 ENCOUNTER — PATIENT MESSAGE (OUTPATIENT)
Dept: RESEARCH | Facility: HOSPITAL | Age: 26
End: 2021-05-12

## 2021-09-12 PROBLEM — N83.209 OVARIAN CYST: Status: ACTIVE | Noted: 2021-09-12

## 2021-09-12 PROBLEM — R50.9 FEVER: Status: ACTIVE | Noted: 2021-09-12

## 2021-09-13 PROBLEM — R79.89 ELEVATED SERUM CREATININE: Status: ACTIVE | Noted: 2021-09-13

## 2021-09-13 PROBLEM — N13.39 OTHER HYDRONEPHROSIS: Status: ACTIVE | Noted: 2021-09-13

## 2021-09-13 PROBLEM — K38.9 DISORDER OF APPENDIX: Status: ACTIVE | Noted: 2021-09-13

## 2021-09-13 PROBLEM — D72.820 LYMPHOCYTOSIS: Status: ACTIVE | Noted: 2021-09-13

## 2021-09-30 ENCOUNTER — OFFICE VISIT (OUTPATIENT)
Dept: OBSTETRICS AND GYNECOLOGY | Facility: CLINIC | Age: 26
End: 2021-09-30
Payer: MEDICAID

## 2021-09-30 VITALS — WEIGHT: 170.63 LBS | BODY MASS INDEX: 29.29 KG/M2

## 2021-09-30 DIAGNOSIS — N80.9 ENDOMETRIOSIS: Primary | ICD-10-CM

## 2021-09-30 PROCEDURE — 99214 OFFICE O/P EST MOD 30 MIN: CPT | Mod: S$PBB,,, | Performed by: STUDENT IN AN ORGANIZED HEALTH CARE EDUCATION/TRAINING PROGRAM

## 2021-09-30 PROCEDURE — 99999 PR PBB SHADOW E&M-EST. PATIENT-LVL III: ICD-10-PCS | Mod: PBBFAC,,, | Performed by: STUDENT IN AN ORGANIZED HEALTH CARE EDUCATION/TRAINING PROGRAM

## 2021-09-30 PROCEDURE — 99213 OFFICE O/P EST LOW 20 MIN: CPT | Mod: PBBFAC,PN | Performed by: STUDENT IN AN ORGANIZED HEALTH CARE EDUCATION/TRAINING PROGRAM

## 2021-09-30 PROCEDURE — 99999 PR PBB SHADOW E&M-EST. PATIENT-LVL III: CPT | Mod: PBBFAC,,, | Performed by: STUDENT IN AN ORGANIZED HEALTH CARE EDUCATION/TRAINING PROGRAM

## 2021-09-30 PROCEDURE — 99214 PR OFFICE/OUTPT VISIT, EST, LEVL IV, 30-39 MIN: ICD-10-PCS | Mod: S$PBB,,, | Performed by: STUDENT IN AN ORGANIZED HEALTH CARE EDUCATION/TRAINING PROGRAM

## 2021-09-30 RX ORDER — ELAGOLIX 200 MG/1
200 TABLET, FILM COATED ORAL 2 TIMES DAILY
Qty: 60 TABLET | Refills: 6 | Status: SHIPPED | OUTPATIENT
Start: 2021-09-30 | End: 2021-10-30

## 2021-10-29 ENCOUNTER — PATIENT MESSAGE (OUTPATIENT)
Dept: OBSTETRICS AND GYNECOLOGY | Facility: CLINIC | Age: 26
End: 2021-10-29
Payer: MEDICAID

## 2021-11-04 ENCOUNTER — OFFICE VISIT (OUTPATIENT)
Dept: OBSTETRICS AND GYNECOLOGY | Facility: CLINIC | Age: 26
End: 2021-11-04
Payer: MEDICAID

## 2021-11-04 VITALS — SYSTOLIC BLOOD PRESSURE: 142 MMHG | DIASTOLIC BLOOD PRESSURE: 90 MMHG | BODY MASS INDEX: 29.44 KG/M2 | WEIGHT: 171.5 LBS

## 2021-11-04 DIAGNOSIS — R10.2 PELVIC PAIN: Primary | ICD-10-CM

## 2021-11-04 PROCEDURE — 99213 OFFICE O/P EST LOW 20 MIN: CPT | Mod: PBBFAC,PN | Performed by: STUDENT IN AN ORGANIZED HEALTH CARE EDUCATION/TRAINING PROGRAM

## 2021-11-04 PROCEDURE — 99999 PR PBB SHADOW E&M-EST. PATIENT-LVL III: CPT | Mod: PBBFAC,,, | Performed by: STUDENT IN AN ORGANIZED HEALTH CARE EDUCATION/TRAINING PROGRAM

## 2021-11-04 PROCEDURE — 99213 PR OFFICE/OUTPT VISIT, EST, LEVL III, 20-29 MIN: ICD-10-PCS | Mod: S$PBB,,, | Performed by: STUDENT IN AN ORGANIZED HEALTH CARE EDUCATION/TRAINING PROGRAM

## 2021-11-04 PROCEDURE — 99999 PR PBB SHADOW E&M-EST. PATIENT-LVL III: ICD-10-PCS | Mod: PBBFAC,,, | Performed by: STUDENT IN AN ORGANIZED HEALTH CARE EDUCATION/TRAINING PROGRAM

## 2021-11-04 PROCEDURE — 99213 OFFICE O/P EST LOW 20 MIN: CPT | Mod: S$PBB,,, | Performed by: STUDENT IN AN ORGANIZED HEALTH CARE EDUCATION/TRAINING PROGRAM

## 2021-11-04 RX ORDER — NORETHINDRONE 5 MG/1
5 TABLET ORAL DAILY
Status: ON HOLD | COMMUNITY
Start: 2021-09-28 | End: 2022-08-29 | Stop reason: HOSPADM

## 2021-11-04 RX ORDER — ELAGOLIX 200 MG/1
TABLET, FILM COATED ORAL
COMMUNITY
Start: 2021-11-01 | End: 2022-04-22 | Stop reason: SDUPTHER

## 2021-11-04 RX ORDER — IBUPROFEN 800 MG/1
800 TABLET ORAL 3 TIMES DAILY
Qty: 60 TABLET | Refills: 1 | Status: SHIPPED | OUTPATIENT
Start: 2021-11-04 | End: 2022-10-24

## 2021-11-19 ENCOUNTER — HOSPITAL ENCOUNTER (OUTPATIENT)
Dept: RADIOLOGY | Facility: HOSPITAL | Age: 26
Discharge: HOME | End: 2021-11-19
Attending: STUDENT IN AN ORGANIZED HEALTH CARE EDUCATION/TRAINING PROGRAM
Payer: MEDICAID

## 2021-11-19 DIAGNOSIS — R10.2 PELVIC PAIN: ICD-10-CM

## 2021-11-19 PROCEDURE — 76830 US PELVIS COMP WITH TRANSVAG NON-OB (XPD): ICD-10-PCS | Mod: 26,,, | Performed by: RADIOLOGY

## 2021-11-19 PROCEDURE — 76856 US EXAM PELVIC COMPLETE: CPT | Mod: TC,PN

## 2021-11-19 PROCEDURE — 76856 US PELVIS COMP WITH TRANSVAG NON-OB (XPD): ICD-10-PCS | Mod: 26,,, | Performed by: RADIOLOGY

## 2021-11-19 PROCEDURE — 76856 US EXAM PELVIC COMPLETE: CPT | Mod: 26,,, | Performed by: RADIOLOGY

## 2021-11-19 PROCEDURE — 76830 TRANSVAGINAL US NON-OB: CPT | Mod: 26,,, | Performed by: RADIOLOGY

## 2022-01-13 ENCOUNTER — TELEPHONE (OUTPATIENT)
Dept: OBSTETRICS AND GYNECOLOGY | Facility: CLINIC | Age: 27
End: 2022-01-13
Payer: MEDICAID

## 2022-01-13 NOTE — TELEPHONE ENCOUNTER
Returned pt's phone call and rescheduled appt time on January 21st to 1:40pm per pt request.    ----- Message from Laure Girard sent at 1/13/2022  1:29 PM CST -----  Regarding: change appt time/ advice  Contact: RONAN THOMAS [[id number]]  Patient is requesting a call back from the nurse stated that she have pain due to cyst on ovary and pressure on bladder. The patient requested a later appt time on 1/21/22, preferably after 1:00 pm after she get off work.   Please call the patient upon request at phone number [phone].

## 2022-01-24 ENCOUNTER — OFFICE VISIT (OUTPATIENT)
Dept: OBSTETRICS AND GYNECOLOGY | Facility: CLINIC | Age: 27
End: 2022-01-24
Payer: MEDICAID

## 2022-01-24 VITALS
HEIGHT: 64 IN | BODY MASS INDEX: 30.45 KG/M2 | DIASTOLIC BLOOD PRESSURE: 74 MMHG | RESPIRATION RATE: 16 BRPM | SYSTOLIC BLOOD PRESSURE: 122 MMHG | WEIGHT: 178.38 LBS

## 2022-01-24 DIAGNOSIS — N80.9 ENDOMETRIOSIS: ICD-10-CM

## 2022-01-24 DIAGNOSIS — N83.201 RIGHT OVARIAN CYST: Primary | ICD-10-CM

## 2022-01-24 PROCEDURE — 99214 PR OFFICE/OUTPT VISIT, EST, LEVL IV, 30-39 MIN: ICD-10-PCS | Mod: S$PBB,,, | Performed by: STUDENT IN AN ORGANIZED HEALTH CARE EDUCATION/TRAINING PROGRAM

## 2022-01-24 PROCEDURE — 1159F PR MEDICATION LIST DOCUMENTED IN MEDICAL RECORD: ICD-10-PCS | Mod: CPTII,,, | Performed by: STUDENT IN AN ORGANIZED HEALTH CARE EDUCATION/TRAINING PROGRAM

## 2022-01-24 PROCEDURE — 3078F DIAST BP <80 MM HG: CPT | Mod: CPTII,,, | Performed by: STUDENT IN AN ORGANIZED HEALTH CARE EDUCATION/TRAINING PROGRAM

## 2022-01-24 PROCEDURE — 3078F PR MOST RECENT DIASTOLIC BLOOD PRESSURE < 80 MM HG: ICD-10-PCS | Mod: CPTII,,, | Performed by: STUDENT IN AN ORGANIZED HEALTH CARE EDUCATION/TRAINING PROGRAM

## 2022-01-24 PROCEDURE — 3074F PR MOST RECENT SYSTOLIC BLOOD PRESSURE < 130 MM HG: ICD-10-PCS | Mod: CPTII,,, | Performed by: STUDENT IN AN ORGANIZED HEALTH CARE EDUCATION/TRAINING PROGRAM

## 2022-01-24 PROCEDURE — 99999 PR PBB SHADOW E&M-EST. PATIENT-LVL III: ICD-10-PCS | Mod: PBBFAC,,, | Performed by: STUDENT IN AN ORGANIZED HEALTH CARE EDUCATION/TRAINING PROGRAM

## 2022-01-24 PROCEDURE — 3008F PR BODY MASS INDEX (BMI) DOCUMENTED: ICD-10-PCS | Mod: CPTII,,, | Performed by: STUDENT IN AN ORGANIZED HEALTH CARE EDUCATION/TRAINING PROGRAM

## 2022-01-24 PROCEDURE — 99214 OFFICE O/P EST MOD 30 MIN: CPT | Mod: S$PBB,,, | Performed by: STUDENT IN AN ORGANIZED HEALTH CARE EDUCATION/TRAINING PROGRAM

## 2022-01-24 PROCEDURE — 3008F BODY MASS INDEX DOCD: CPT | Mod: CPTII,,, | Performed by: STUDENT IN AN ORGANIZED HEALTH CARE EDUCATION/TRAINING PROGRAM

## 2022-01-24 PROCEDURE — 1159F MED LIST DOCD IN RCRD: CPT | Mod: CPTII,,, | Performed by: STUDENT IN AN ORGANIZED HEALTH CARE EDUCATION/TRAINING PROGRAM

## 2022-01-24 PROCEDURE — 99213 OFFICE O/P EST LOW 20 MIN: CPT | Mod: PBBFAC,PN | Performed by: STUDENT IN AN ORGANIZED HEALTH CARE EDUCATION/TRAINING PROGRAM

## 2022-01-24 PROCEDURE — 99999 PR PBB SHADOW E&M-EST. PATIENT-LVL III: CPT | Mod: PBBFAC,,, | Performed by: STUDENT IN AN ORGANIZED HEALTH CARE EDUCATION/TRAINING PROGRAM

## 2022-01-24 PROCEDURE — 3074F SYST BP LT 130 MM HG: CPT | Mod: CPTII,,, | Performed by: STUDENT IN AN ORGANIZED HEALTH CARE EDUCATION/TRAINING PROGRAM

## 2022-01-24 NOTE — PROGRESS NOTES
History & Physical  Gynecology      SUBJECTIVE:     Chief Complaint: Follow-up       History of Present Illness:    26 y.o. here today for f/u of right ovarian cyst, pelvic pain and known endometriosis. Patient has been on orlissa and aygestin for add back therapy for 2 1/2 months now. No improvement in pain. Pain got so severe 1/10 presented to the ED at [facility]. US report there shows 5cm ovarian cyst, unchanged from previous US in November. Pain was so bad it was causing her to think she had a bladder infection and that she couldn't fully empty bladder. No fever. No nausea, chills. Patient now wants definitive management with ovarian cystectomy. Has tried OCPs, depo lupron, and orlissa. Doesn't want ovary removal at this time.     Previous HPI 11/4/21  26 y.o. presents for left sided pelvic pain. Previous HPI below. Pain started Sunday, had 101 fever Monday. Now pain is 6/10. Of note, has had left ovary removed. Hx of chron's Dx. Denies changes in bowel habits. No nausea,vomiting. No CP, SOB. No sick contacts. Feeling better today than did Sunday/Monday. Able to work. Taking ibuprofen PRN. Taking orlissa and aygestin.      Previous HPI 9/30/21:   26 y.o. presents today for f/u of TOA vs infected endometrioma. Was in hospital from 9/12-9/17. She was admitted for IV abx, had IR drain 6.5cm complex ovarian cyst. Also had urology place stent 2/2 to hydronephrosis. Having stent removed today. Finished po abx Saturday. Afebrile. Tolerating po. Denies nausea/vomiting. Pain well controlled. Saw her GYN in [place] who put her on aygestin. Taking now. Wants to discuss next steps with her endometriosis. Patient has long standing hx of stage IV endometriosis and chron dx. Had hyst/lower anterior colon resection 2/2 to endo causing bowel obstruction, full surgery hx below.      Has been on depo lupron and orlissa before hyst. Hasn't been on any hormonal suppression since then.     Review of patient's allergies  indicates:   Allergen Reactions    Iodinated contrast media Nausea And Vomiting       Past Medical History:   Diagnosis Date    Asthma     as a child    Chronic abdominal pain     Constipation     Crohn disease     Endometriosis     HTN (hypertension)     Liver lesion     Seizures     2020    UTI (urinary tract infection)     hx of e coli in urine     Past Surgical History:   Procedure Laterality Date    APPENDECTOMY  2015    ASPIRATION OF ABSCESS N/A 10/25/2018    Procedure: ASPIRATION, ABSCESS;  Surgeon: Millicent Diagnostic Provider;  Location: [facility] OR [address] [place] [address];  Service: Anesthesiology;  Laterality: N/A;     SECTION  2009    CHOLECYSTECTOMY  2018    CYSTOSCOPY W/ URETERAL STENT PLACEMENT Right 2021    Procedure: CYSTOSCOPY, WITH URETERAL STENT INSERTION;  Surgeon: Syed Dickens MD;  Location: [facility];  Service: Urology;  Laterality: Right;    HERNIA REPAIR  2018    HYSTERECTOMY       OB History    No obstetric history on file.       Family History   Problem Relation Age of Onset    Hypertension Mother      Social History     Tobacco Use    Smoking status: Never Smoker    Smokeless tobacco: Never Used   Substance Use Topics    Alcohol use: No    Drug use: No       Current Outpatient Medications   Medication Sig    ALOE VERA ORAL Take 1 capsule by mouth once daily.    gabapentin (NEURONTIN) 300 MG capsule Take 1 capsule by mouth 2 (two) times daily as needed (pain).     ibuprofen (ADVIL,MOTRIN) 800 MG tablet Take 1 tablet (800 mg total) by mouth 3 (three) times daily.    naproxen (NAPROSYN) 500 MG tablet Take 500 mg by mouth 2 (two) times daily.    norethindrone (AYGESTIN) 5 mg Tab Take 5 mg by mouth once daily.    ORILISSA 200 mg Tab Take by mouth.    pantoprazole (PROTONIX) 40 MG tablet Take 40 mg by mouth daily as needed (heartburn).     vit C-vit D3-E-zinc-elderberry (AIRBORNE VITS ZINC ELDERBERRY) 65 mg-3.15 mcg- 3.35 mg-1 mg Chew Take 1 tablet  by mouth once daily.    ZTLIDO 1.8 % PtMd Apply 1 patch topically nightly as needed (pain).     doxycycline (VIBRA-TABS) 100 MG tablet Take 1 tablet (100 mg total) by mouth every 12 (twelve) hours. (Patient not taking: No sig reported)    oxyCODONE-acetaminophen (PERCOCET) 5-325 mg per tablet Take 1 tablet by mouth every 4 (four) hours as needed for Pain. (Patient not taking: No sig reported)     No current facility-administered medications for this visit.         Review of Systems:  Review of Systems   Constitutional: Negative for chills, fatigue and fever.   HENT: Negative for congestion.    Eyes: Negative for visual disturbance.   Respiratory: Negative for cough and shortness of breath.    Cardiovascular: Negative for chest pain and palpitations.   Gastrointestinal: Positive for abdominal pain. Negative for abdominal distention, constipation, diarrhea, nausea and vomiting.   Genitourinary: Positive for pelvic pain. Negative for difficulty urinating, dysuria, hematuria, vaginal bleeding and vaginal discharge.   Skin: Negative for rash.   Neurological: Negative for dizziness, seizures, light-headedness and headaches.   Hematological: Does not bruise/bleed easily.   Psychiatric/Behavioral: Negative for dysphoric mood. The patient is not nervous/anxious.         OBJECTIVE:     Physical Exam:  Physical Exam  Vitals reviewed.   Constitutional:       General: She is not in acute distress.     Appearance: Normal appearance. She is well-developed.   HENT:      Head: Normocephalic and atraumatic.   Cardiovascular:      Rate and Rhythm: Normal rate and regular rhythm.   Pulmonary:      Effort: Pulmonary effort is normal.   Abdominal:      General: There is no distension.      Palpations: Abdomen is soft.   Genitourinary:     Vagina: Normal.      Comments: Normal external female genitalia, normal hair distribution. Vaginal mucosa pink, moist, well rugated, scant white physiologic discharge. Vaginal cuff intact. Right  adnexal fullness and tenderness on exam.   Skin:     General: Skin is warm.   Neurological:      Mental Status: She is alert and oriented to person, place, and time.   Psychiatric:         Behavior: Behavior normal.         Thought Content: Thought content normal.         Judgment: Judgment normal.           ASSESSMENT:       ICD-10-CM ICD-9-CM    1. Right ovarian cyst  N83.201 620.2    2. Endometriosis  N80.9 617.9        No orders of the defined types were placed in this encounter.          Plan:   - ED note an US reviewed    - Long discussion with patient today about her endometriosis diagnosis. Patient has already had 2 bowel resections for endometriosis, 1 because of endometriosis in her bowel. Discussed oophorectomy would be ideal for patient with endometriosis since she has failed medical management. This would put her in surgical menopause but she has no contraindication to HRT.  She doesn't want ovary removed at this time.   - has not established with GI for chron's dx, needs to make sure stable  - discussed because of her extensive endometriosis and surgical history recommend surgery with specialist vs gyn/oncology. Discussed could refer her to Dr. Carmichael in [place] and if no availability next option would by gyn/oncology. Also discussed option of going back to her surgeon who originally did her surgery.   - She opts to try go back to her surgeon who originally operated on her in [place], if she has trouble she will contact me about referrals.     Gisela Valenzuela M.D.  Obstetrics and Gynecology

## 2022-03-30 ENCOUNTER — OFFICE VISIT (OUTPATIENT)
Dept: OBSTETRICS AND GYNECOLOGY | Facility: CLINIC | Age: 27
End: 2022-03-30
Payer: MEDICAID

## 2022-03-30 VITALS
HEIGHT: 64 IN | BODY MASS INDEX: 31.66 KG/M2 | SYSTOLIC BLOOD PRESSURE: 124 MMHG | DIASTOLIC BLOOD PRESSURE: 72 MMHG | WEIGHT: 185.44 LBS

## 2022-03-30 DIAGNOSIS — N83.201 CYST OF RIGHT OVARY: Primary | ICD-10-CM

## 2022-03-30 PROCEDURE — 4010F ACE/ARB THERAPY RXD/TAKEN: CPT | Mod: CPTII,,, | Performed by: SPECIALIST

## 2022-03-30 PROCEDURE — 3078F DIAST BP <80 MM HG: CPT | Mod: CPTII,,, | Performed by: SPECIALIST

## 2022-03-30 PROCEDURE — 1159F PR MEDICATION LIST DOCUMENTED IN MEDICAL RECORD: ICD-10-PCS | Mod: CPTII,,, | Performed by: SPECIALIST

## 2022-03-30 PROCEDURE — 3074F PR MOST RECENT SYSTOLIC BLOOD PRESSURE < 130 MM HG: ICD-10-PCS | Mod: CPTII,,, | Performed by: SPECIALIST

## 2022-03-30 PROCEDURE — 1159F MED LIST DOCD IN RCRD: CPT | Mod: CPTII,,, | Performed by: SPECIALIST

## 2022-03-30 PROCEDURE — 99214 PR OFFICE/OUTPT VISIT, EST, LEVL IV, 30-39 MIN: ICD-10-PCS | Mod: S$PBB,,, | Performed by: SPECIALIST

## 2022-03-30 PROCEDURE — 3008F BODY MASS INDEX DOCD: CPT | Mod: CPTII,,, | Performed by: SPECIALIST

## 2022-03-30 PROCEDURE — 99999 PR PBB SHADOW E&M-EST. PATIENT-LVL III: ICD-10-PCS | Mod: PBBFAC,,, | Performed by: SPECIALIST

## 2022-03-30 PROCEDURE — 3008F PR BODY MASS INDEX (BMI) DOCUMENTED: ICD-10-PCS | Mod: CPTII,,, | Performed by: SPECIALIST

## 2022-03-30 PROCEDURE — 3074F SYST BP LT 130 MM HG: CPT | Mod: CPTII,,, | Performed by: SPECIALIST

## 2022-03-30 PROCEDURE — 99213 OFFICE O/P EST LOW 20 MIN: CPT | Mod: PBBFAC,PN | Performed by: SPECIALIST

## 2022-03-30 PROCEDURE — 99214 OFFICE O/P EST MOD 30 MIN: CPT | Mod: S$PBB,,, | Performed by: SPECIALIST

## 2022-03-30 PROCEDURE — 99999 PR PBB SHADOW E&M-EST. PATIENT-LVL III: CPT | Mod: PBBFAC,,, | Performed by: SPECIALIST

## 2022-03-30 PROCEDURE — 4010F PR ACE/ARB THEARPY RXD/TAKEN: ICD-10-PCS | Mod: CPTII,,, | Performed by: SPECIALIST

## 2022-03-30 PROCEDURE — 3078F PR MOST RECENT DIASTOLIC BLOOD PRESSURE < 80 MM HG: ICD-10-PCS | Mod: CPTII,,, | Performed by: SPECIALIST

## 2022-03-30 NOTE — PROGRESS NOTES
25 yo BF with h/o endometisos and extensive bowel adhesions and has undergone hyst and LSO RS and bowel resection. Pt has been on DepoLupron therapy as well as Orlissa for over a year. Has been followed recently right ovarian mass and has receieved ureteral stent placement for right hydro. Pt presents today with continued complaint right sided PP. In the past R oophrectomy has been rec but pt declined. Currently denies n/v, c/d, weight loss/gain, hematuria, dysuria or flank pain  Past Medical History:   Diagnosis Date    Abnormal Pap smear of cervix     Chronic abdominal pain     Constipation     Crohn disease     Endometriosis     HTN (hypertension)     Infertility, female     Liver lesion     Mental disorder     Seizures     2020    UTI (urinary tract infection)     hx of e coli in urine       Past Surgical History:   Procedure Laterality Date    APPENDECTOMY  2015    ASPIRATION OF ABSCESS N/A 10/25/2018    Procedure: ASPIRATION, ABSCESS;  Surgeon: Millicent Diagnostic Provider;  Location: [facility] OR [address] [place] [address];  Service: Anesthesiology;  Laterality: N/A;     SECTION  2009    CHOLECYSTECTOMY  2018    CYSTOSCOPY W/ URETERAL STENT PLACEMENT Right 2021    Procedure: CYSTOSCOPY, WITH URETERAL STENT INSERTION;  Surgeon: Syed Dickens MD;  Location: [facility];  Service: Urology;  Laterality: Right;    HERNIA REPAIR  2018    HYSTERECTOMY         Family History   Problem Relation Age of Onset    Hypertension Mother     Stroke Maternal Grandmother     Diabetes Sister     Eclampsia Sister     Breast cancer Maternal Aunt     Ovarian cancer Maternal Aunt     Ovarian cancer Maternal Aunt     Celiac disease Paternal Aunt     Cancer Neg Hx     Colon cancer Neg Hx     Miscarriages / Stillbirths Neg Hx      labor Neg Hx        Social History     Socioeconomic History    Marital status: Single   Tobacco Use    Smoking status: Never Smoker    Smokeless tobacco:  Never Used   Substance and Sexual Activity    Alcohol use: No    Drug use: No    Sexual activity: Not Currently     Birth control/protection: See Surgical Hx       Current Outpatient Medications   Medication Sig Dispense Refill    gabapentin (NEURONTIN) 300 MG capsule Take 1 capsule by mouth 2 (two) times daily as needed (pain).       ibuprofen (ADVIL,MOTRIN) 800 MG tablet Take 1 tablet (800 mg total) by mouth 3 (three) times daily. 60 tablet 1    naproxen (NAPROSYN) 500 MG tablet Take 500 mg by mouth 2 (two) times daily.      norethindrone (AYGESTIN) 5 mg Tab Take 5 mg by mouth once daily.      ORILISSA 200 mg Tab Take by mouth.      vit C-vit D3-E-zinc-elderberry (AIRBORNE VITS ZINC ELDERBERRY) 65 mg-3.15 mcg- 3.35 mg-1 mg Chew Take 1 tablet by mouth once daily.      ALOE VERA ORAL Take 1 capsule by mouth once daily.      doxycycline (VIBRA-TABS) 100 MG tablet Take 1 tablet (100 mg total) by mouth every 12 (twelve) hours. (Patient not taking: No sig reported) 14 tablet 0    oxyCODONE-acetaminophen (PERCOCET) 5-325 mg per tablet Take 1 tablet by mouth every 4 (four) hours as needed for Pain. (Patient not taking: No sig reported) 20 tablet 0    pantoprazole (PROTONIX) 40 MG tablet Take 40 mg by mouth daily as needed (heartburn).       ZTLIDO 1.8 % PtMd Apply 1 patch topically nightly as needed (pain).        No current facility-administered medications for this visit.       Review of patient's allergies indicates:   Allergen Reactions    Iodinated contrast media Nausea And Vomiting       Review of System:   General: no chills, fever, night sweats, weight gain or weight loss  Psychological: no depression or suicidal ideation  Breasts: no new or changing breast lumps, nipple discharge or masses.  Respiratory: no cough, shortness of breath, or wheezing  Cardiovascular: no chest pain or dyspnea on exertion  Gastrointestinal: no abdominal pain, change in bowel habits, or black or bloody  stools  Genito-Urinary: no incontinence, urinary frequency/urgency or vulvar/vaginal symptoms, or abnormal vaginal bleeding.  Musculoskeletal: no gait disturbance or muscular weakness    PE:   APPEARANCE: Well nourished, well developed, in no acute distress.  NECK: Neck symmetric without masses or thyromegaly.  NODES: No inguinal lymph node enlargement.  ABDOMEN: Soft. No tenderness or masses. No hepatosplenomegaly. No hernias.  BREASTS:deferred    PELVIC: Normal external female genitalia without lesions. Normal hair distribution. Adequate perineal body, normal urethral meatus. Vagina moist and well rugated without lesions or discharge. No significant cystocele or rectocele. Uterus and cervix surgically absent. Bimanual exam revealed right sided fullness    NOTE  NURSING PERSONAL PRESENT FOR ENTIRE PHYSICAL EXAM    I had a long discussion and dexplained adhesions, and right sided mass  I rec Robotic exploration with oophorectomy However, rec repeat u/s to assess pelvic fullness on todfays exam.  I will refer pt to Dr Mendez for consultation and recommended robotic assisted procedure as opposed to open laparotomy  Answered all questions and pt is agreeable with plan

## 2022-04-21 ENCOUNTER — HOSPITAL ENCOUNTER (OUTPATIENT)
Dept: RADIOLOGY | Facility: HOSPITAL | Age: 27
Discharge: HOME | End: 2022-04-21
Attending: SPECIALIST
Payer: MEDICAID

## 2022-04-21 DIAGNOSIS — N83.201 CYST OF RIGHT OVARY: ICD-10-CM

## 2022-04-21 PROCEDURE — 76830 TRANSVAGINAL US NON-OB: CPT | Mod: 26,,, | Performed by: RADIOLOGY

## 2022-04-21 PROCEDURE — 76856 US EXAM PELVIC COMPLETE: CPT | Mod: 26,,, | Performed by: RADIOLOGY

## 2022-04-21 PROCEDURE — 76856 US PELVIS COMP WITH TRANSVAG NON-OB (XPD): ICD-10-PCS | Mod: 26,,, | Performed by: RADIOLOGY

## 2022-04-21 PROCEDURE — 76830 US PELVIS COMP WITH TRANSVAG NON-OB (XPD): ICD-10-PCS | Mod: 26,,, | Performed by: RADIOLOGY

## 2022-04-21 PROCEDURE — 76830 TRANSVAGINAL US NON-OB: CPT | Mod: TC,PN

## 2022-04-22 RX ORDER — ELAGOLIX 200 MG/1
200 TABLET, FILM COATED ORAL 2 TIMES DAILY
Qty: 60 TABLET | Refills: 6 | Status: SHIPPED | OUTPATIENT
Start: 2022-04-22 | End: 2022-05-22

## 2022-04-22 NOTE — TELEPHONE ENCOUNTER
Refill request- orilissa 200mg tabs    Last filled- 03/22/22  Last visit- 03/30/22    Med pended for approval; please advise*

## 2022-06-23 ENCOUNTER — TELEPHONE (OUTPATIENT)
Dept: OBSTETRICS AND GYNECOLOGY | Facility: CLINIC | Age: 27
End: 2022-06-23
Payer: MEDICAID

## 2022-06-23 NOTE — TELEPHONE ENCOUNTER
----- Message from Carolina Galeana sent at 6/23/2022 11:59 AM CDT -----  Contact: [phone]  Type:  Sooner Appointment Request    Caller is requesting a sooner appointment.  Caller declined first available appointment listed below.  Caller will not accept being placed on the waitlist and is requesting a message be sent to doctor.    Name of Caller:  Pt  When is the first available appointment?  6/30  Symptoms:  Cyst on right uriter/ er fu discharge date 6/23  Best Call Back Number:  [phone]    Additional Information:  Pt discharge Hospital in [place]

## 2022-06-27 ENCOUNTER — LAB VISIT (OUTPATIENT)
Dept: LAB | Facility: HOSPITAL | Age: 27
End: 2022-06-27
Attending: STUDENT IN AN ORGANIZED HEALTH CARE EDUCATION/TRAINING PROGRAM
Payer: MEDICAID

## 2022-06-27 ENCOUNTER — OFFICE VISIT (OUTPATIENT)
Dept: OBSTETRICS AND GYNECOLOGY | Facility: CLINIC | Age: 27
End: 2022-06-27
Payer: MEDICAID

## 2022-06-27 VITALS
RESPIRATION RATE: 16 BRPM | SYSTOLIC BLOOD PRESSURE: 143 MMHG | BODY MASS INDEX: 32.21 KG/M2 | HEIGHT: 64 IN | WEIGHT: 188.69 LBS | DIASTOLIC BLOOD PRESSURE: 108 MMHG

## 2022-06-27 DIAGNOSIS — N83.201 CYST OF RIGHT OVARY: ICD-10-CM

## 2022-06-27 DIAGNOSIS — N13.30 HYDRONEPHROSIS, UNSPECIFIED HYDRONEPHROSIS TYPE: ICD-10-CM

## 2022-06-27 DIAGNOSIS — N80.9 ENDOMETRIOSIS: Primary | ICD-10-CM

## 2022-06-27 LAB
ANION GAP SERPL CALC-SCNC: 9 MMOL/L (ref 8–16)
BUN SERPL-MCNC: 12 MG/DL (ref 6–20)
CALCIUM SERPL-MCNC: 9.9 MG/DL (ref 8.7–10.5)
CHLORIDE SERPL-SCNC: 103 MMOL/L (ref 95–110)
CO2 SERPL-SCNC: 23 MMOL/L (ref 23–29)
CREAT SERPL-MCNC: 1.5 MG/DL (ref 0.5–1.4)
EST. GFR  (AFRICAN AMERICAN): 54.3 ML/MIN/1.73 M^2
EST. GFR  (NON AFRICAN AMERICAN): 47.1 ML/MIN/1.73 M^2
GLUCOSE SERPL-MCNC: 93 MG/DL (ref 70–110)
POTASSIUM SERPL-SCNC: 4.1 MMOL/L (ref 3.5–5.1)
SODIUM SERPL-SCNC: 135 MMOL/L (ref 136–145)

## 2022-06-27 PROCEDURE — 3080F DIAST BP >= 90 MM HG: CPT | Mod: CPTII,,, | Performed by: STUDENT IN AN ORGANIZED HEALTH CARE EDUCATION/TRAINING PROGRAM

## 2022-06-27 PROCEDURE — 99999 PR PBB SHADOW E&M-EST. PATIENT-LVL IV: CPT | Mod: PBBFAC,,, | Performed by: STUDENT IN AN ORGANIZED HEALTH CARE EDUCATION/TRAINING PROGRAM

## 2022-06-27 PROCEDURE — 99214 OFFICE O/P EST MOD 30 MIN: CPT | Mod: S$PBB,,, | Performed by: STUDENT IN AN ORGANIZED HEALTH CARE EDUCATION/TRAINING PROGRAM

## 2022-06-27 PROCEDURE — 4010F ACE/ARB THERAPY RXD/TAKEN: CPT | Mod: CPTII,,, | Performed by: STUDENT IN AN ORGANIZED HEALTH CARE EDUCATION/TRAINING PROGRAM

## 2022-06-27 PROCEDURE — 99999 PR PBB SHADOW E&M-EST. PATIENT-LVL IV: ICD-10-PCS | Mod: PBBFAC,,, | Performed by: STUDENT IN AN ORGANIZED HEALTH CARE EDUCATION/TRAINING PROGRAM

## 2022-06-27 PROCEDURE — 3008F BODY MASS INDEX DOCD: CPT | Mod: CPTII,,, | Performed by: STUDENT IN AN ORGANIZED HEALTH CARE EDUCATION/TRAINING PROGRAM

## 2022-06-27 PROCEDURE — 36415 COLL VENOUS BLD VENIPUNCTURE: CPT | Mod: PN | Performed by: STUDENT IN AN ORGANIZED HEALTH CARE EDUCATION/TRAINING PROGRAM

## 2022-06-27 PROCEDURE — 3080F PR MOST RECENT DIASTOLIC BLOOD PRESSURE >= 90 MM HG: ICD-10-PCS | Mod: CPTII,,, | Performed by: STUDENT IN AN ORGANIZED HEALTH CARE EDUCATION/TRAINING PROGRAM

## 2022-06-27 PROCEDURE — 99214 PR OFFICE/OUTPT VISIT, EST, LEVL IV, 30-39 MIN: ICD-10-PCS | Mod: S$PBB,,, | Performed by: STUDENT IN AN ORGANIZED HEALTH CARE EDUCATION/TRAINING PROGRAM

## 2022-06-27 PROCEDURE — 99214 OFFICE O/P EST MOD 30 MIN: CPT | Mod: PBBFAC,PN | Performed by: STUDENT IN AN ORGANIZED HEALTH CARE EDUCATION/TRAINING PROGRAM

## 2022-06-27 PROCEDURE — 1159F PR MEDICATION LIST DOCUMENTED IN MEDICAL RECORD: ICD-10-PCS | Mod: CPTII,,, | Performed by: STUDENT IN AN ORGANIZED HEALTH CARE EDUCATION/TRAINING PROGRAM

## 2022-06-27 PROCEDURE — 3008F PR BODY MASS INDEX (BMI) DOCUMENTED: ICD-10-PCS | Mod: CPTII,,, | Performed by: STUDENT IN AN ORGANIZED HEALTH CARE EDUCATION/TRAINING PROGRAM

## 2022-06-27 PROCEDURE — 4010F PR ACE/ARB THEARPY RXD/TAKEN: ICD-10-PCS | Mod: CPTII,,, | Performed by: STUDENT IN AN ORGANIZED HEALTH CARE EDUCATION/TRAINING PROGRAM

## 2022-06-27 PROCEDURE — 3077F SYST BP >= 140 MM HG: CPT | Mod: CPTII,,, | Performed by: STUDENT IN AN ORGANIZED HEALTH CARE EDUCATION/TRAINING PROGRAM

## 2022-06-27 PROCEDURE — 1159F MED LIST DOCD IN RCRD: CPT | Mod: CPTII,,, | Performed by: STUDENT IN AN ORGANIZED HEALTH CARE EDUCATION/TRAINING PROGRAM

## 2022-06-27 PROCEDURE — 80048 BASIC METABOLIC PNL TOTAL CA: CPT | Performed by: STUDENT IN AN ORGANIZED HEALTH CARE EDUCATION/TRAINING PROGRAM

## 2022-06-27 PROCEDURE — 3077F PR MOST RECENT SYSTOLIC BLOOD PRESSURE >= 140 MM HG: ICD-10-PCS | Mod: CPTII,,, | Performed by: STUDENT IN AN ORGANIZED HEALTH CARE EDUCATION/TRAINING PROGRAM

## 2022-06-27 NOTE — PROGRESS NOTES
History & Physical  Gynecology      SUBJECTIVE:     Chief Complaint: ED f/u-Cyst       History of Present Illness:    Here today for f/u from ED. Patient with known stage IV endometriosis. S/p hyst/LSO/bowel resection for endometriosis. Has 5-6cm complex cyst on right ovary that we've been monitoring the past year. Managed on orlissa and agyestin add back therapy. Last saw me 1/22, my partner Dr. Gould 3/22. Patient presented with worsening pain to [facility] ([facility]). Cr noted to 1.5, diagnosed with FRANTZ 2/2 to [facility] effect.  Had bilateral hydronephrosis and bilateral ureteral stents placed. Previously had seen me and wasn't ready for oophrectomy, only wanted cystectomy vs IR drainage.  Had offered referrals to Dr. Amol CHAND in [place] vs gyn/oncology. Declined at that time but ready for 2nd opinion now.     Today pain is ok. Tolerating PO. Last ate breakfast on the way here. No fever, nausea/vomiting, chills, having cramping pain from stents. Doesn't have urology f/u from [facility], doesn't know when stents are coming out. Still taking orlissa and agyestin.     Previous HPIs:    HPI: 1/22  26 y.o. here today for f/u of right ovarian cyst, pelvic pain and known endometriosis. Patient has been on orlissa and aygestin for add back therapy for 2 1/2 months now. No improvement in pain. Pain got so severe 10/10 presented to the ED at [facility]. US report there shows 5cm ovarian cyst, unchanged from previous US in November. Pain was so bad it was causing her to think she had a bladder infection and that she couldn't fully empty bladder. No fever. No nausea, chills. Patient now wants definitive management with ovarian cystectomy. Has tried OCPs, depo lupron, and orlissa. Doesn't want ovary removal at this time.      Previous HPI 11/4/21  26 y.o. presents for left sided pelvic pain. Previous HPI below. Pain started Sunday, had 101 fever Monday. Now pain is 6/10. Of note, has had left ovary removed. Hx of  chron's Dx. Denies changes in bowel habits. No nausea,vomiting. No CP, SOB. No sick contacts. Feeling better today than did /Monday. Able to work. Taking ibuprofen PRN. Taking orlissa and aygestin.      Previous HPI 21:   26 y.o. presents today for f/u of TOA vs infected endometrioma. Was in hospital from -. She was admitted for IV abx, had IR drain 6.5cm complex ovarian cyst. Also had urology place stent 2/ to hydronephrosis. Having stent removed today. Finished po abx Saturday. Afebrile. Tolerating po. Denies nausea/vomiting. Pain well controlled. Saw her GYN in [place] who put her on aygestin. Taking now. Wants to discuss next steps with her endometriosis. Patient has long standing hx of stage IV endometriosis and chron dx. Had hyst/lower anterior colon resection 2 to endo causing bowel obstruction, full surgery hx below.      Has been on depo lupron and orlissa before hyst. Hasn't been on any hormonal suppression since then.         Review of patient's allergies indicates:   Allergen Reactions    Iodinated contrast media Nausea And Vomiting       Past Medical History:   Diagnosis Date    Abnormal Pap smear of cervix     Chronic abdominal pain     Constipation     Crohn disease     Endometriosis     HTN (hypertension)     Infertility, female     Liver lesion     Mental disorder     Seizures     2020    UTI (urinary tract infection)     hx of e coli in urine     Past Surgical History:   Procedure Laterality Date    APPENDECTOMY  2015    ASPIRATION OF ABSCESS N/A 10/25/2018    Procedure: ASPIRATION, ABSCESS;  Surgeon: Millicent Diagnostic Provider;  Location: [facility] OR [address] [place] [address];  Service: Anesthesiology;  Laterality: N/A;     SECTION  2009    CHOLECYSTECTOMY  2018    CYSTOSCOPY W/ URETERAL STENT PLACEMENT Right 2021    Procedure: CYSTOSCOPY, WITH URETERAL STENT INSERTION;  Surgeon: Syed Dickens MD;  Location: [facility] OR;  Service: Urology;   Laterality: Right;    HERNIA REPAIR  2018    HYSTERECTOMY       OB History        1    Para   1    Term   1            AB        Living   1       SAB        IAB        Ectopic        Multiple        Live Births   1               Family History   Problem Relation Age of Onset    Hypertension Mother     Stroke Maternal Grandmother     Diabetes Sister     Eclampsia Sister     Breast cancer Maternal Aunt     Ovarian cancer Maternal Aunt     Ovarian cancer Maternal Aunt     Celiac disease Paternal Aunt     Cancer Neg Hx     Colon cancer Neg Hx     Miscarriages / Stillbirths Neg Hx      labor Neg Hx      Social History     Tobacco Use    Smoking status: Never Smoker    Smokeless tobacco: Never Used   Substance Use Topics    Alcohol use: No    Drug use: No       Current Outpatient Medications   Medication Sig    ALOE VERA ORAL Take 1 capsule by mouth once daily.    doxycycline (VIBRA-TABS) 100 MG tablet Take 1 tablet (100 mg total) by mouth every 12 (twelve) hours.    gabapentin (NEURONTIN) 300 MG capsule Take 1 capsule by mouth 2 (two) times daily as needed (pain).     ibuprofen (ADVIL,MOTRIN) 800 MG tablet Take 1 tablet (800 mg total) by mouth 3 (three) times daily.    naproxen (NAPROSYN) 500 MG tablet Take 500 mg by mouth 2 (two) times daily.    norethindrone (AYGESTIN) 5 mg Tab Take 5 mg by mouth once daily.    oxyCODONE-acetaminophen (PERCOCET) 5-325 mg per tablet Take 1 tablet by mouth every 4 (four) hours as needed for Pain.    pantoprazole (PROTONIX) 40 MG tablet Take 40 mg by mouth daily as needed (heartburn).     vit C-vit D3-E-zinc-elderberry (AIRBORNE VITS ZINC ELDERBERRY) 65 mg-3.15 mcg- 3.35 mg-1 mg Chew Take 1 tablet by mouth once daily.    ZTLIDO 1.8 % PtMd Apply 1 patch topically nightly as needed (pain).      No current facility-administered medications for this visit.         Review of Systems:  Review of Systems   Constitutional: Negative for chills,  fatigue and fever.   HENT: Negative for congestion.    Eyes: Negative for visual disturbance.   Respiratory: Negative for cough and shortness of breath.    Cardiovascular: Negative for chest pain and palpitations.   Gastrointestinal: Negative for abdominal distention, abdominal pain, constipation, diarrhea, nausea and vomiting.   Genitourinary: Negative for difficulty urinating, dysuria, hematuria, vaginal bleeding and vaginal discharge.   Skin: Negative for rash.   Neurological: Negative for dizziness, seizures, light-headedness and headaches.   Hematological: Does not bruise/bleed easily.   Psychiatric/Behavioral: Negative for dysphoric mood. The patient is not nervous/anxious.         OBJECTIVE:     Physical Exam:  Physical Exam  Vitals reviewed.   Constitutional:       General: She is not in acute distress.     Appearance: Normal appearance. She is well-developed.   HENT:      Head: Normocephalic and atraumatic.   Cardiovascular:      Rate and Rhythm: Normal rate and regular rhythm.   Pulmonary:      Effort: Pulmonary effort is normal.   Abdominal:      General: There is no distension.      Palpations: Abdomen is soft.      Comments: Mass palpated. Abdomen no rebound or guarding noted, Tender in RLQ   Genitourinary:     Vagina: Normal.      Comments: Deferred today   Skin:     General: Skin is warm.   Neurological:      Mental Status: She is alert and oriented to person, place, and time.   Psychiatric:         Behavior: Behavior normal.         Thought Content: Thought content normal.         Judgment: Judgment normal.       TVUS:  The uterus is surgically absent.  The left ovary is not seen.  In the right hemipelvis there is a complex cystic mass measuring 5.1 x 3.3 x 3.5 cm.  This is smaller and has a more hypoechoic appearance than was seen previously.  This may represent a resolving hematoma or other complex fluid collection.  There is no evidence of free fluid.  Identifiable ovarian tissue is not  seen.     Impression:     Decreased size to the complex right hemipelvic fluid collection relative to November 19, 2021.  ASSESSMENT:       ICD-10-CM ICD-9-CM    1. Endometriosis  N80.9 617.9 Ambulatory referral/consult to Gynecologic Oncology   2. Hydronephrosis, unspecified hydronephrosis type  N13.30 591 Basic Metabolic Panel   3. Cyst of right ovary  N83.201 620.2        Orders Placed This Encounter   Procedures    Basic Metabolic Panel     Standing Status:   Future     Number of Occurrences:   1     Standing Expiration Date:   6/27/2023    Ambulatory referral/consult to Gynecologic Oncology     Standing Status:   Future     Standing Expiration Date:   7/27/2023     Referral Priority:   Routine     Referral Type:   Consultation     Referral Reason:   Specialty Services Required     Requested Specialty:   Gynecologic Oncology     Number of Visits Requested:   1           Plan:      - previous note with Dr. Gould, patient referred to Dr. Mendez, however patient denies this, hasn't been contacted.   - can't see US from ED in  but our last imaging has cyst at 5cm. Patient states it was 6.5cm, so growing  - BMP ordered today to trend Cr given FRANTZ   - Long discussion with patient given her extensive endometriosis and surgical history. Recommend expert opinion with gyn/oncology. Discussed possible surgery vs IR drainage of cyst if they don't think she is an operative candidate  - see previous notes for previous extensively counseling  - At my last discussion with her she was going to go back to her OBGYN in  for surgery- states they won't see her now and to go back to OBGYN in [place].   - understands possible need for HRT, surgical menopause, and definitive infertility, no egg freezing etc.   - referral for gyn/oncology placed. RN nurse navigator messaged.   - all questions answered in agreement with plan  - Stents in place per urology.     Gisela Valenzuela M.D.  Obstetrics and Gynecology     Roughly 45 mins  spent with patient, chart review, reviewing labs and previous notes as well as documentation of the encounter today.

## 2022-07-02 ENCOUNTER — PATIENT MESSAGE (OUTPATIENT)
Dept: OBSTETRICS AND GYNECOLOGY | Facility: CLINIC | Age: 27
End: 2022-07-02
Payer: MEDICAID

## 2022-07-05 ENCOUNTER — TELEPHONE (OUTPATIENT)
Dept: GYNECOLOGIC ONCOLOGY | Facility: CLINIC | Age: 27
End: 2022-07-05
Payer: MEDICAID

## 2022-07-05 NOTE — NURSING
New referral received from Dr Valenzuela for recent diagnosis of benign right ovarian mass that needs removal and endometriosis. Pt called and name and  verified. Explained my role as nurse navigator and direct number provided. Pt scheduled to see Dr Helton in the soonest available new pt appointment. Patient encouraged to call for any questions or concerns about her care or appointments. Pt verbalized understanding. Date time and location of appointment reviewed with pt.    Oncology Navigation   Intake  Cancer Type: Gynecologic  Internal / External Referral: Internal  Referral Source: Dr Angie Valenzuela  Date of Referral: 2022  Initial Nurse Navigator Contact: 2022  Referral to Initial Contact Timeline (days): 8  Date Worked: 2022     Treatment             Acuity      Follow Up  No follow-ups on file.

## 2022-07-14 ENCOUNTER — OFFICE VISIT (OUTPATIENT)
Dept: GYNECOLOGIC ONCOLOGY | Facility: CLINIC | Age: 27
End: 2022-07-14
Payer: MEDICAID

## 2022-07-14 VITALS
HEIGHT: 63 IN | DIASTOLIC BLOOD PRESSURE: 72 MMHG | HEART RATE: 86 BPM | SYSTOLIC BLOOD PRESSURE: 135 MMHG | BODY MASS INDEX: 33.84 KG/M2 | WEIGHT: 191 LBS

## 2022-07-14 DIAGNOSIS — N80.9 ENDOMETRIOSIS: Primary | ICD-10-CM

## 2022-07-14 DIAGNOSIS — N83.201 RIGHT OVARIAN CYST: ICD-10-CM

## 2022-07-14 PROCEDURE — 3075F PR MOST RECENT SYSTOLIC BLOOD PRESS GE 130-139MM HG: ICD-10-PCS | Mod: CPTII,,, | Performed by: OBSTETRICS & GYNECOLOGY

## 2022-07-14 PROCEDURE — 1159F MED LIST DOCD IN RCRD: CPT | Mod: CPTII,,, | Performed by: OBSTETRICS & GYNECOLOGY

## 2022-07-14 PROCEDURE — 3008F BODY MASS INDEX DOCD: CPT | Mod: CPTII,,, | Performed by: OBSTETRICS & GYNECOLOGY

## 2022-07-14 PROCEDURE — 4010F ACE/ARB THERAPY RXD/TAKEN: CPT | Mod: CPTII,,, | Performed by: OBSTETRICS & GYNECOLOGY

## 2022-07-14 PROCEDURE — 3075F SYST BP GE 130 - 139MM HG: CPT | Mod: CPTII,,, | Performed by: OBSTETRICS & GYNECOLOGY

## 2022-07-14 PROCEDURE — 99213 OFFICE O/P EST LOW 20 MIN: CPT | Mod: PBBFAC | Performed by: OBSTETRICS & GYNECOLOGY

## 2022-07-14 PROCEDURE — 99215 OFFICE O/P EST HI 40 MIN: CPT | Mod: S$PBB,,, | Performed by: OBSTETRICS & GYNECOLOGY

## 2022-07-14 PROCEDURE — 3078F DIAST BP <80 MM HG: CPT | Mod: CPTII,,, | Performed by: OBSTETRICS & GYNECOLOGY

## 2022-07-14 PROCEDURE — 99999 PR PBB SHADOW E&M-EST. PATIENT-LVL III: ICD-10-PCS | Mod: PBBFAC,,, | Performed by: OBSTETRICS & GYNECOLOGY

## 2022-07-14 PROCEDURE — 3008F PR BODY MASS INDEX (BMI) DOCUMENTED: ICD-10-PCS | Mod: CPTII,,, | Performed by: OBSTETRICS & GYNECOLOGY

## 2022-07-14 PROCEDURE — 1159F PR MEDICATION LIST DOCUMENTED IN MEDICAL RECORD: ICD-10-PCS | Mod: CPTII,,, | Performed by: OBSTETRICS & GYNECOLOGY

## 2022-07-14 PROCEDURE — 4010F PR ACE/ARB THEARPY RXD/TAKEN: ICD-10-PCS | Mod: CPTII,,, | Performed by: OBSTETRICS & GYNECOLOGY

## 2022-07-14 PROCEDURE — 99215 PR OFFICE/OUTPT VISIT, EST, LEVL V, 40-54 MIN: ICD-10-PCS | Mod: S$PBB,,, | Performed by: OBSTETRICS & GYNECOLOGY

## 2022-07-14 PROCEDURE — 99999 PR PBB SHADOW E&M-EST. PATIENT-LVL III: CPT | Mod: PBBFAC,,, | Performed by: OBSTETRICS & GYNECOLOGY

## 2022-07-14 PROCEDURE — 3078F PR MOST RECENT DIASTOLIC BLOOD PRESSURE < 80 MM HG: ICD-10-PCS | Mod: CPTII,,, | Performed by: OBSTETRICS & GYNECOLOGY

## 2022-07-14 NOTE — PROGRESS NOTES
REFERRING PROVIDER  Gisela Valenzuela MD     HISTORY OF PRESENT CONDITION  CC: Stage IV Endometriosis  Nimco Ríos is a 26 y.o.  with complicated endometriosis history.  She was referred to discuss possible RSO of complex right adnexal mass.  She has h/o hysterectomy / LSO / LAR for endometriosis.   Has 5-6cm complex cyst on right ovary that Dr. Valenzuela has been monitoring the past year. Managed on orlissa and agyestin add back therapy. Previously tried OCPs and Lupron.  She presented with worsening pain to [facility] ([facility]). Cr noted to 1.5, diagnosed with FRANTZ 2/2 to mass effect.  Had bilateral hydronephrosis and bilateral ureteral stents placed. Previously had seen Dr. Valenzuela and wasn't ready for oophrectomy, only wanted cystectomy vs IR drainage.  [see Dr. Valenzuela 2022 note for detailed history].    OF NOTE:   Patient has h/o Crohns with anal fistula in the past; She takes occasional hydrocodone for pain now;  She is a phlebotomist and MA.      DATA REVIEWED:  2018 Resection of liver capsule endometrioma at .  (per patient report)    2020 TLH / LSO / LAR:  (Axel Mendoza, GYN, Aron Mitchell, Colorectal)  FINDINGS:  Stage IV endometriosis was noted involving the cul-de-sac, bladder, and  left and right adnexa. The sigmoid colon had been previously diagnosed  with stenosis secondary to endometriosis;  [Sigmoid] Stricture at 15 cm    2021 CT AP:  There is right-sided hydronephrosis.  There is a complex cystic mass in the right side of the pelvis measuring 6.7 x 4.2 cm.  This is compressing the right ureter causing the hydronephrosis.  The appendix is mildly prominent in size measuring 7 mm there is minimal surrounding inflammation cannot rule out appendicitis with certainty.    2022 US Pelvis:  The uterus is surgically absent.  The left ovary is not seen.  In the right hemipelvis there is a complex cystic mass measuring 5.1 x 3.3 x 3.5 cm.  This is smaller and has a more  06-Apr-2019 16:16 hypoechoic appearance than was seen previously.  This may represent a resolving hematoma or other complex fluid collection.  There is no evidence of free fluid.  Identifiable ovarian tissue is not seen.    6/24/2022 cysto bilateral rtg pyelograms right ureteral dilation and stent:  mid right ureteral obstruction - tight with inability to get contrast through. Wire placed with difficulty, ureter dilated and 6f x 26 cm JJ stent placed on rt;  Normal left ureter and collecting system    7/4/2022CT AP:  1.  Status post placement of a double-J right ureteral stent since the previous CT with some improvement in the right-sided ureteropyelocaliectasis although there is persistent lesser right sided ureteropyelocaliectasis, with no obvious renal or ureteral calculus.  2.  Persistent, enlarged right ovary measuring at least 6.5 x 4.5 cm.  3.  Status post cholecystectomy with mild intrahepatic and extrahepatic bile duct dilatation, similar to the previous study, with no choledocholithiasis. 4.  There is a tubular appearing, fluid-filled structure in the right lower abdominal quadrant, as discussed above, of uncertain etiology. This was present on each of the two previous CT scans. It does not appear to represent a bowel loop as it appears to be somewhat blind-ending, but, according to the patient's history, the appendix has been previously resected. Correlate clinically with respect to any pain in this region. 5.  Postop findings consistent with partial previous sigmoid colon resection.      Past Medical History:   Diagnosis Date    Abnormal Pap smear of cervix     Chronic abdominal pain     Constipation     Crohn disease     Endometriosis     HTN (hypertension)     Infertility, female     Liver lesion     Mental disorder     Seizures     2/2020    UTI (urinary tract infection)     hx of e coli in urine      Current Outpatient Medications   Medication Sig    ALOE VERA ORAL Take 1 capsule by mouth once daily.     gabapentin (NEURONTIN) 300 MG capsule Take 1 capsule by mouth 2 (two) times daily as needed (pain).     ibuprofen (ADVIL,MOTRIN) 800 MG tablet Take 1 tablet (800 mg total) by mouth 3 (three) times daily.    naproxen (NAPROSYN) 500 MG tablet Take 500 mg by mouth 2 (two) times daily.    norethindrone (AYGESTIN) 5 mg Tab Take 5 mg by mouth once daily.    pantoprazole (PROTONIX) 40 MG tablet Take 40 mg by mouth daily as needed (heartburn).     vit C-vit D3-E-zinc-elderberry (AIRBORNE VITS ZINC ELDERBERRY) 65 mg-3.15 mcg- 3.35 mg-1 mg Chew Take 1 tablet by mouth once daily.    doxycycline (VIBRA-TABS) 100 MG tablet Take 1 tablet (100 mg total) by mouth every 12 (twelve) hours.    oxyCODONE-acetaminophen (PERCOCET) 5-325 mg per tablet Take 1 tablet by mouth every 4 (four) hours as needed for Pain.    ZTLIDO 1.8 % PtMd Apply 1 patch topically nightly as needed (pain).      No current facility-administered medications for this visit.     Review of patient's allergies indicates:   Allergen Reactions    Iodinated contrast media Nausea And Vomiting       Past Surgical History:   Procedure Laterality Date    APPENDECTOMY  2015    ASPIRATION OF ABSCESS N/A 10/25/2018    Procedure: ASPIRATION, ABSCESS;  Surgeon: Millicent Diagnostic Provider;  Location: [address];  Service: Anesthesiology;  Laterality: N/A;     SECTION  2009    CHOLECYSTECTOMY  2018    CYSTOSCOPY W/ URETERAL STENT PLACEMENT Right 2021    Procedure: CYSTOSCOPY, WITH URETERAL STENT INSERTION;  Surgeon: Syed Dickens MD;  Location: [facility];  Service: Urology;  Laterality: Right;    HERNIA REPAIR  2018    HYSTERECTOMY          FAMILY HISTORY  Family History   Problem Relation Age of Onset    Hypertension Mother     Stroke Maternal Grandmother     Diabetes Sister     Eclampsia Sister     Breast cancer Maternal Aunt     Ovarian cancer Maternal Aunt     Ovarian cancer Maternal Aunt     Celiac disease  Paternal Aunt     Cancer Neg Hx     Colon cancer Neg Hx     Miscarriages / Stillbirths Neg Hx      labor Neg Hx        SOCIAL HISTORY    reports that she has never smoked. She has never used smokeless tobacco. She reports that she does not drink alcohol and does not use drugs.    REVIEW OF SYSTEMS  Review of Systems   Constitutional: Negative for activity change, appetite change, chills, fatigue, fever and unexpected weight change.   Respiratory: Negative for cough, chest tightness and shortness of breath.    Cardiovascular: Negative for chest pain, palpitations and leg swelling.   Gastrointestinal: Positive for abdominal pain and blood in stool (rare). Negative for abdominal distention, constipation, diarrhea, nausea and vomiting.   Genitourinary: Positive for hematuria (prior to recent stent placement), pelvic pain and vaginal pain. Negative for dyspareunia, dysuria, frequency, menstrual problem, urgency, vaginal bleeding and vaginal discharge.   Musculoskeletal: Negative for arthralgias and myalgias.   Skin: Negative for color change and rash.   Neurological: Negative for dizziness, weakness and light-headedness.   Hematological: Negative for adenopathy.   Psychiatric/Behavioral: Negative for decreased concentration, dysphoric mood and sleep disturbance. The patient is not nervous/anxious.      OBJECTIVE   Vitals:    22 0901   BP: 135/72   Pulse: 86      Body mass index is 33.83 kg/m².     Physical Exam:   Constitutional: She is oriented to person, place, and time. She appears well-developed and well-nourished.    HENT:   Head: Normocephalic and atraumatic.    Eyes: Conjunctivae and EOM are normal. No scleral icterus.    Neck: No thyromegaly present.    Cardiovascular: Normal rate and regular rhythm.  Exam reveals no cyanosis and no edema.     Pulmonary/Chest: Effort normal and breath sounds normal. No respiratory distress.        Abdominal: Soft. She exhibits no distension and no mass. There is  abdominal tenderness (right lower quadrant). There is no rebound and no guarding. No hernia.     Genitourinary:    Genitourinary Comments: Vulva - normal without lesions  Vagina - healthy without lesions; white discharge  Uterus - surgically absent  Cervix - surgically absent   Adnexa - limited due to tenderness on right, no masses or fullness on left, non-tender             Musculoskeletal: Moves all extremeties.      Lymphadenopathy:     She has no cervical adenopathy.    Neurological: She is alert and oriented to person, place, and time.    Skin: No rash noted. No cyanosis or erythema.    Psychiatric: She has a normal mood and affect. Judgment and thought content normal.     ECOG status: 0    LABORATORY DATA  Lab data reviewed.    RADIOLOGICAL DATA  Radiology data reviewed.    PATHOLOGY DATA  Pathology data reviewed.    ASSESSMENT    1. Endometriosis    2. 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  I concur with need for definitive surgery and recommended right salpingo-oophorectomy with right ureterolysis and resection of endometriosis.      I discussed extensively the risks, benefits, alternatives, and indications of the planned procedure to include the risk of damage to bowel, bladder, ureter, or any other abdominal or pelvic organ.  Additionally, she understands the standard surgical risks of infection, allergic reaction, bleeding possibly severe enough to require blood transfusion.  She expresses understanding, was given an opportunity to ask any questions, and now she strongly desires to proceed with planned procedure.    I had a very vilma conversation with the patient today that in the setting of chronic pelvic pain, endometriosis, and multiple prior surgeries, that while surgery may help, there is a high likelihood that her pain may not be improved and could potentially be worsened.  She also expresses understanding that  undergoing surgery subjects her to potential risks such as damage to bowel, bladder, ureter, major blood vessels or other abdominal / pelvic organs and structures with increased risk in setting of known surgeries, Crohns, endometriosis.  We specifically addressed the risk of fistula formation, bowel injury and possible need for a colostomy and or ileostomy.  Finally, the patient understands I am seeing her as a surgical consultant and that if further pain management is needed after the immediate postoperative period, that she would be cared for by her referring gynecologist and / or referred to a chronic pain clinic.     PLAN  1.  Schedule robotic assisted right salpingo-oophorectomy, ureterolysis, and resection of endometriosis and [facility]        Freeman Galarza MD

## 2022-07-25 ENCOUNTER — TELEPHONE (OUTPATIENT)
Dept: GYNECOLOGIC ONCOLOGY | Facility: CLINIC | Age: 27
End: 2022-07-25
Payer: MEDICAID

## 2022-07-26 ENCOUNTER — TELEPHONE (OUTPATIENT)
Dept: GYNECOLOGIC ONCOLOGY | Facility: CLINIC | Age: 27
End: 2022-07-26
Payer: MEDICAID

## 2022-07-26 DIAGNOSIS — N80.9 ENDOMETRIOSIS: Primary | ICD-10-CM

## 2022-07-26 DIAGNOSIS — N83.201 RIGHT OVARIAN CYST: ICD-10-CM

## 2022-08-18 ENCOUNTER — TELEPHONE (OUTPATIENT)
Dept: GYNECOLOGIC ONCOLOGY | Facility: CLINIC | Age: 27
End: 2022-08-18
Payer: MEDICAID

## 2022-08-26 ENCOUNTER — ANESTHESIA EVENT (OUTPATIENT)
Dept: SURGERY | Facility: HOSPITAL | Age: 27
End: 2022-08-26
Payer: MEDICAID

## 2022-08-26 ENCOUNTER — TELEPHONE (OUTPATIENT)
Dept: GYNECOLOGIC ONCOLOGY | Facility: CLINIC | Age: 27
End: 2022-08-26
Payer: MEDICAID

## 2022-08-26 RX ORDER — LOSARTAN POTASSIUM 50 MG/1
50 TABLET ORAL DAILY
COMMUNITY

## 2022-08-26 NOTE — PRE-PROCEDURE INSTRUCTIONS
PreOp Instructions given:   - Verbal medication information (what to hold and what to take)   - NPO guidelines 2300  - Arrival place directions given; time to be given the day before procedure by the   Surgeon's Office 0500 - [facility]  - Bathing with antibacterial soap   - Don't wear any jewelry or bring any valuables AM of surgery   - No makeup or moisturizer to face   - No perfume/cologne, powder, lotions or aftershave   Pt. verbalized understanding.   Pt denies any h/o Anesthesia/Sedation complications or side effects.

## 2022-08-29 ENCOUNTER — ANESTHESIA (OUTPATIENT)
Dept: SURGERY | Facility: HOSPITAL | Age: 27
End: 2022-08-29
Payer: MEDICAID

## 2022-08-29 ENCOUNTER — HOSPITAL ENCOUNTER (OUTPATIENT)
Facility: HOSPITAL | Age: 27
Discharge: HOME | End: 2022-08-29
Attending: OBSTETRICS & GYNECOLOGY | Admitting: OBSTETRICS & GYNECOLOGY
Payer: MEDICAID

## 2022-08-29 VITALS
TEMPERATURE: 98 F | SYSTOLIC BLOOD PRESSURE: 131 MMHG | HEART RATE: 80 BPM | RESPIRATION RATE: 18 BRPM | OXYGEN SATURATION: 100 % | BODY MASS INDEX: 34.19 KG/M2 | DIASTOLIC BLOOD PRESSURE: 82 MMHG | WEIGHT: 193 LBS

## 2022-08-29 DIAGNOSIS — Z90.721 STATUS POST UNILATERAL SALPINGO-OOPHORECTOMY: Primary | ICD-10-CM

## 2022-08-29 DIAGNOSIS — N83.201 CYST OF RIGHT OVARY: ICD-10-CM

## 2022-08-29 DIAGNOSIS — N80.9 ENDOMETRIOSIS: ICD-10-CM

## 2022-08-29 PROCEDURE — 27200671 HC STIMUCATH NEEDLE/ CATHETER: Performed by: STUDENT IN AN ORGANIZED HEALTH CARE EDUCATION/TRAINING PROGRAM

## 2022-08-29 PROCEDURE — 88305 TISSUE EXAM BY PATHOLOGIST: ICD-10-PCS | Mod: 26,,, | Performed by: PATHOLOGY

## 2022-08-29 PROCEDURE — 00840 ANES IPER PX LOWER ABD NOS: CPT | Performed by: OBSTETRICS & GYNECOLOGY

## 2022-08-29 PROCEDURE — 58661 PR LAP,RMV  ADNEXAL STRUCTURE: ICD-10-PCS | Mod: 22,RT,, | Performed by: OBSTETRICS & GYNECOLOGY

## 2022-08-29 PROCEDURE — 36000711: Performed by: OBSTETRICS & GYNECOLOGY

## 2022-08-29 PROCEDURE — 27000221 HC OXYGEN, UP TO 24 HOURS

## 2022-08-29 PROCEDURE — D9220A PRA ANESTHESIA: ICD-10-PCS | Mod: CRNA,,, | Performed by: NURSE ANESTHETIST, CERTIFIED REGISTERED

## 2022-08-29 PROCEDURE — 71000015 HC POSTOP RECOV 1ST HR: Performed by: OBSTETRICS & GYNECOLOGY

## 2022-08-29 PROCEDURE — 53899 PR LYSIS OF RETROPERITONEAL FIBROSIS: ICD-10-PCS | Mod: ,,, | Performed by: OBSTETRICS & GYNECOLOGY

## 2022-08-29 PROCEDURE — D9220A PRA ANESTHESIA: ICD-10-PCS | Mod: ANES,,, | Performed by: ANESTHESIOLOGY

## 2022-08-29 PROCEDURE — D9220A PRA ANESTHESIA: Mod: ANES,,, | Performed by: ANESTHESIOLOGY

## 2022-08-29 PROCEDURE — 71000016 HC POSTOP RECOV ADDL HR: Performed by: OBSTETRICS & GYNECOLOGY

## 2022-08-29 PROCEDURE — 64488 TAP BLOCK BI INJECTION: CPT | Performed by: STUDENT IN AN ORGANIZED HEALTH CARE EDUCATION/TRAINING PROGRAM

## 2022-08-29 PROCEDURE — 88305 TISSUE EXAM BY PATHOLOGIST: CPT | Mod: 26,,, | Performed by: PATHOLOGY

## 2022-08-29 PROCEDURE — 94761 N-INVAS EAR/PLS OXIMETRY MLT: CPT | Mod: 59

## 2022-08-29 PROCEDURE — 64488 BILATERAL QL SINGLE SHOT: ICD-10-PCS | Mod: 59,,, | Performed by: ANESTHESIOLOGY

## 2022-08-29 PROCEDURE — 27201423 OPTIME MED/SURG SUP & DEVICES STERILE SUPPLY: Performed by: OBSTETRICS & GYNECOLOGY

## 2022-08-29 PROCEDURE — 25000003 PHARM REV CODE 250: Performed by: STUDENT IN AN ORGANIZED HEALTH CARE EDUCATION/TRAINING PROGRAM

## 2022-08-29 PROCEDURE — 25000003 PHARM REV CODE 250: Performed by: ANESTHESIOLOGY

## 2022-08-29 PROCEDURE — 63600175 PHARM REV CODE 636 W HCPCS: Mod: JG | Performed by: STUDENT IN AN ORGANIZED HEALTH CARE EDUCATION/TRAINING PROGRAM

## 2022-08-29 PROCEDURE — 64488 TAP BLOCK BI INJECTION: CPT | Mod: 59,,, | Performed by: ANESTHESIOLOGY

## 2022-08-29 PROCEDURE — 63600175 PHARM REV CODE 636 W HCPCS: Performed by: ANESTHESIOLOGY

## 2022-08-29 PROCEDURE — 58661 LAPAROSCOPY REMOVE ADNEXA: CPT | Mod: AS,22,RT, | Performed by: PHYSICIAN ASSISTANT

## 2022-08-29 PROCEDURE — 88305 TISSUE EXAM BY PATHOLOGIST: CPT | Performed by: PATHOLOGY

## 2022-08-29 PROCEDURE — C2628 CATHETER, OCCLUSION: HCPCS | Performed by: OBSTETRICS & GYNECOLOGY

## 2022-08-29 PROCEDURE — 53899 UNLISTED PX URINARY SYSTEM: CPT | Mod: ,,, | Performed by: OBSTETRICS & GYNECOLOGY

## 2022-08-29 PROCEDURE — 99900035 HC TECH TIME PER 15 MIN (STAT)

## 2022-08-29 PROCEDURE — 58661 LAPAROSCOPY REMOVE ADNEXA: CPT | Mod: 22,RT,, | Performed by: OBSTETRICS & GYNECOLOGY

## 2022-08-29 PROCEDURE — 37000009 HC ANESTHESIA EA ADD 15 MINS: Performed by: OBSTETRICS & GYNECOLOGY

## 2022-08-29 PROCEDURE — 25000003 PHARM REV CODE 250: Performed by: OBSTETRICS & GYNECOLOGY

## 2022-08-29 PROCEDURE — 63600175 PHARM REV CODE 636 W HCPCS: Performed by: STUDENT IN AN ORGANIZED HEALTH CARE EDUCATION/TRAINING PROGRAM

## 2022-08-29 PROCEDURE — 58661 PR LAP,RMV  ADNEXAL STRUCTURE: ICD-10-PCS | Mod: AS,22,RT, | Performed by: PHYSICIAN ASSISTANT

## 2022-08-29 PROCEDURE — 71000033 HC RECOVERY, INTIAL HOUR: Performed by: OBSTETRICS & GYNECOLOGY

## 2022-08-29 PROCEDURE — 36000710: Performed by: OBSTETRICS & GYNECOLOGY

## 2022-08-29 PROCEDURE — 37000008 HC ANESTHESIA 1ST 15 MINUTES: Performed by: OBSTETRICS & GYNECOLOGY

## 2022-08-29 PROCEDURE — D9220A PRA ANESTHESIA: Mod: CRNA,,, | Performed by: NURSE ANESTHETIST, CERTIFIED REGISTERED

## 2022-08-29 RX ORDER — SENNOSIDES 8.6 MG/1
TABLET ORAL 2 TIMES DAILY
Qty: 30 TABLET | Refills: 0 | Status: SHIPPED | OUTPATIENT
Start: 2022-08-29

## 2022-08-29 RX ORDER — FENTANYL CITRATE 50 UG/ML
25-200 INJECTION, SOLUTION INTRAMUSCULAR; INTRAVENOUS
Status: DISCONTINUED | OUTPATIENT
Start: 2022-08-29 | End: 2022-08-29 | Stop reason: HOSPADM

## 2022-08-29 RX ORDER — OXYCODONE HYDROCHLORIDE 5 MG/1
5 TABLET ORAL EVERY 4 HOURS PRN
Qty: 15 TABLET | Refills: 0 | Status: SHIPPED | OUTPATIENT
Start: 2022-08-29

## 2022-08-29 RX ORDER — LIDOCAINE HYDROCHLORIDE 10 MG/ML
INJECTION, SOLUTION INTRAVENOUS
Status: DISCONTINUED | OUTPATIENT
Start: 2022-08-29 | End: 2022-08-29

## 2022-08-29 RX ORDER — SCOLOPAMINE TRANSDERMAL SYSTEM 1 MG/1
1 PATCH, EXTENDED RELEASE TRANSDERMAL
Status: DISCONTINUED | OUTPATIENT
Start: 2022-08-29 | End: 2022-08-29 | Stop reason: HOSPADM

## 2022-08-29 RX ORDER — PROPOFOL 10 MG/ML
VIAL (ML) INTRAVENOUS
Status: DISCONTINUED | OUTPATIENT
Start: 2022-08-29 | End: 2022-08-29

## 2022-08-29 RX ORDER — ONDANSETRON 2 MG/ML
4 INJECTION INTRAMUSCULAR; INTRAVENOUS DAILY PRN
Status: DISCONTINUED | OUTPATIENT
Start: 2022-08-29 | End: 2022-08-29 | Stop reason: HOSPADM

## 2022-08-29 RX ORDER — HALOPERIDOL 5 MG/ML
0.5 INJECTION INTRAMUSCULAR EVERY 10 MIN PRN
Status: DISCONTINUED | OUTPATIENT
Start: 2022-08-29 | End: 2022-08-29 | Stop reason: HOSPADM

## 2022-08-29 RX ORDER — MIDAZOLAM HYDROCHLORIDE 1 MG/ML
.5-4 INJECTION INTRAMUSCULAR; INTRAVENOUS
Status: DISCONTINUED | OUTPATIENT
Start: 2022-08-29 | End: 2022-08-29 | Stop reason: HOSPADM

## 2022-08-29 RX ORDER — METHYLENE BLUE 5 MG/ML
INJECTION INTRAVENOUS
Status: DISCONTINUED | OUTPATIENT
Start: 2022-08-29 | End: 2022-08-29

## 2022-08-29 RX ORDER — ONDANSETRON 2 MG/ML
4 INJECTION INTRAMUSCULAR; INTRAVENOUS EVERY 4 HOURS PRN
Status: DISCONTINUED | OUTPATIENT
Start: 2022-08-29 | End: 2022-08-29 | Stop reason: HOSPADM

## 2022-08-29 RX ORDER — LIDOCAINE HYDROCHLORIDE 10 MG/ML
1 INJECTION, SOLUTION EPIDURAL; INFILTRATION; INTRACAUDAL; PERINEURAL ONCE
Status: DISCONTINUED | OUTPATIENT
Start: 2022-08-29 | End: 2022-08-29 | Stop reason: HOSPADM

## 2022-08-29 RX ORDER — FENTANYL CITRATE 50 UG/ML
INJECTION, SOLUTION INTRAMUSCULAR; INTRAVENOUS
Status: DISCONTINUED | OUTPATIENT
Start: 2022-08-29 | End: 2022-08-29

## 2022-08-29 RX ORDER — ACETAMINOPHEN 500 MG
1000 TABLET ORAL EVERY 6 HOURS PRN
Status: DISCONTINUED | OUTPATIENT
Start: 2022-08-29 | End: 2022-08-29 | Stop reason: HOSPADM

## 2022-08-29 RX ORDER — ONDANSETRON 2 MG/ML
INJECTION INTRAMUSCULAR; INTRAVENOUS
Status: DISCONTINUED | OUTPATIENT
Start: 2022-08-29 | End: 2022-08-29

## 2022-08-29 RX ORDER — IBUPROFEN 800 MG/1
800 TABLET ORAL EVERY 8 HOURS
Qty: 30 TABLET | Refills: 1 | Status: SHIPPED | OUTPATIENT
Start: 2022-08-29

## 2022-08-29 RX ORDER — OXYCODONE AND ACETAMINOPHEN 5; 325 MG/1; MG/1
1 TABLET ORAL
Status: DISCONTINUED | OUTPATIENT
Start: 2022-08-29 | End: 2022-08-29 | Stop reason: HOSPADM

## 2022-08-29 RX ORDER — ROCURONIUM BROMIDE 10 MG/ML
INJECTION, SOLUTION INTRAVENOUS
Status: DISCONTINUED | OUTPATIENT
Start: 2022-08-29 | End: 2022-08-29

## 2022-08-29 RX ORDER — HYDROMORPHONE HYDROCHLORIDE 1 MG/ML
0.2 INJECTION, SOLUTION INTRAMUSCULAR; INTRAVENOUS; SUBCUTANEOUS
Status: DISCONTINUED | OUTPATIENT
Start: 2022-08-29 | End: 2022-08-29 | Stop reason: HOSPADM

## 2022-08-29 RX ORDER — MUPIROCIN 20 MG/G
OINTMENT TOPICAL
Status: DISCONTINUED | OUTPATIENT
Start: 2022-08-29 | End: 2022-08-29 | Stop reason: HOSPADM

## 2022-08-29 RX ORDER — HYDROMORPHONE HYDROCHLORIDE 1 MG/ML
0.2 INJECTION, SOLUTION INTRAMUSCULAR; INTRAVENOUS; SUBCUTANEOUS EVERY 5 MIN PRN
Status: DISCONTINUED | OUTPATIENT
Start: 2022-08-29 | End: 2022-08-29 | Stop reason: HOSPADM

## 2022-08-29 RX ORDER — SODIUM CHLORIDE 0.9 % (FLUSH) 0.9 %
3 SYRINGE (ML) INJECTION
Status: DISCONTINUED | OUTPATIENT
Start: 2022-08-29 | End: 2022-08-29 | Stop reason: HOSPADM

## 2022-08-29 RX ORDER — DEXMEDETOMIDINE HYDROCHLORIDE 100 UG/ML
INJECTION, SOLUTION INTRAVENOUS
Status: DISCONTINUED | OUTPATIENT
Start: 2022-08-29 | End: 2022-08-29

## 2022-08-29 RX ORDER — KETOROLAC TROMETHAMINE 30 MG/ML
15 INJECTION, SOLUTION INTRAMUSCULAR; INTRAVENOUS EVERY 6 HOURS PRN
Status: DISCONTINUED | OUTPATIENT
Start: 2022-08-29 | End: 2022-08-29 | Stop reason: HOSPADM

## 2022-08-29 RX ORDER — CEFAZOLIN SODIUM/WATER 2 G/20 ML
2 SYRINGE (ML) INTRAVENOUS
Status: COMPLETED | OUTPATIENT
Start: 2022-08-29 | End: 2022-08-29

## 2022-08-29 RX ORDER — DEXAMETHASONE SODIUM PHOSPHATE 4 MG/ML
INJECTION, SOLUTION INTRA-ARTICULAR; INTRALESIONAL; INTRAMUSCULAR; INTRAVENOUS; SOFT TISSUE
Status: DISCONTINUED | OUTPATIENT
Start: 2022-08-29 | End: 2022-08-29

## 2022-08-29 RX ORDER — LIDOCAINE HYDROCHLORIDE 10 MG/ML
INJECTION, SOLUTION EPIDURAL; INFILTRATION; INTRACAUDAL; PERINEURAL
Status: DISCONTINUED | OUTPATIENT
Start: 2022-08-29 | End: 2022-08-29 | Stop reason: HOSPADM

## 2022-08-29 RX ORDER — ACETAMINOPHEN 10 MG/ML
INJECTION, SOLUTION INTRAVENOUS
Status: DISCONTINUED | OUTPATIENT
Start: 2022-08-29 | End: 2022-08-29

## 2022-08-29 RX ORDER — HYDROMORPHONE HYDROCHLORIDE 2 MG/ML
INJECTION, SOLUTION INTRAMUSCULAR; INTRAVENOUS; SUBCUTANEOUS
Status: DISCONTINUED | OUTPATIENT
Start: 2022-08-29 | End: 2022-08-29

## 2022-08-29 RX ORDER — OXYCODONE HYDROCHLORIDE 5 MG/1
5 TABLET ORAL EVERY 4 HOURS PRN
Status: DISCONTINUED | OUTPATIENT
Start: 2022-08-29 | End: 2022-08-29 | Stop reason: HOSPADM

## 2022-08-29 RX ORDER — ACETAMINOPHEN 500 MG
500 TABLET ORAL EVERY 6 HOURS
Qty: 30 TABLET | Refills: 1 | Status: SHIPPED | OUTPATIENT
Start: 2022-08-29

## 2022-08-29 RX ORDER — NEOSTIGMINE METHYLSULFATE 0.5 MG/ML
INJECTION, SOLUTION INTRAVENOUS
Status: DISCONTINUED | OUTPATIENT
Start: 2022-08-29 | End: 2022-08-29

## 2022-08-29 RX ORDER — BUPIVACAINE HYDROCHLORIDE 7.5 MG/ML
INJECTION, SOLUTION EPIDURAL; RETROBULBAR
Status: COMPLETED | OUTPATIENT
Start: 2022-08-29 | End: 2022-08-29

## 2022-08-29 RX ADMIN — PROPOFOL 150 MG: 10 INJECTION, EMULSION INTRAVENOUS at 07:08

## 2022-08-29 RX ADMIN — HYDROMORPHONE HYDROCHLORIDE 0.2 MG: 1 INJECTION, SOLUTION INTRAMUSCULAR; INTRAVENOUS; SUBCUTANEOUS at 01:08

## 2022-08-29 RX ADMIN — SODIUM CHLORIDE: 0.9 INJECTION, SOLUTION INTRAVENOUS at 08:08

## 2022-08-29 RX ADMIN — HYDROMORPHONE HYDROCHLORIDE 0.4 MG: 2 INJECTION INTRAMUSCULAR; INTRAVENOUS; SUBCUTANEOUS at 12:08

## 2022-08-29 RX ADMIN — ROCURONIUM BROMIDE 10 MG: 10 INJECTION, SOLUTION INTRAVENOUS at 11:08

## 2022-08-29 RX ADMIN — NEOSTIGMINE METHYLSULFATE 4 MG: 0.5 INJECTION INTRAVENOUS at 12:08

## 2022-08-29 RX ADMIN — DEXAMETHASONE SODIUM PHOSPHATE 8 MG: 4 INJECTION, SOLUTION INTRAMUSCULAR; INTRAVENOUS at 07:08

## 2022-08-29 RX ADMIN — BUPIVACAINE HYDROCHLORIDE 30 ML: 7.5 INJECTION, SOLUTION EPIDURAL; RETROBULBAR at 06:08

## 2022-08-29 RX ADMIN — ROCURONIUM BROMIDE 10 MG: 10 INJECTION, SOLUTION INTRAVENOUS at 09:08

## 2022-08-29 RX ADMIN — ROCURONIUM BROMIDE 20 MG: 10 INJECTION, SOLUTION INTRAVENOUS at 10:08

## 2022-08-29 RX ADMIN — ROCURONIUM BROMIDE 30 MG: 10 INJECTION, SOLUTION INTRAVENOUS at 07:08

## 2022-08-29 RX ADMIN — ACETAMINOPHEN 1000 MG: 10 INJECTION, SOLUTION INTRAVENOUS at 12:08

## 2022-08-29 RX ADMIN — SODIUM CHLORIDE: 0.9 INJECTION, SOLUTION INTRAVENOUS at 11:08

## 2022-08-29 RX ADMIN — Medication 2 G: at 11:08

## 2022-08-29 RX ADMIN — OXYCODONE 5 MG: 5 TABLET ORAL at 01:08

## 2022-08-29 RX ADMIN — FENTANYL CITRATE 50 MCG: 50 INJECTION, SOLUTION INTRAMUSCULAR; INTRAVENOUS at 08:08

## 2022-08-29 RX ADMIN — LIDOCAINE HYDROCHLORIDE 50 MG: 10 INJECTION, SOLUTION INTRAVENOUS at 07:08

## 2022-08-29 RX ADMIN — DEXMEDETOMIDINE HYDROCHLORIDE 8 MCG: 100 INJECTION, SOLUTION, CONCENTRATE INTRAVENOUS at 12:08

## 2022-08-29 RX ADMIN — GLYCOPYRROLATE 0.4 MG: 0.2 INJECTION, SOLUTION INTRAMUSCULAR; INTRAVENOUS at 12:08

## 2022-08-29 RX ADMIN — METHYLENE BLUE 50 MG: 5 INJECTION INTRAVENOUS at 10:08

## 2022-08-29 RX ADMIN — SODIUM CHLORIDE: 0.9 INJECTION, SOLUTION INTRAVENOUS at 06:08

## 2022-08-29 RX ADMIN — ONDANSETRON 4 MG: 2 INJECTION INTRAMUSCULAR; INTRAVENOUS at 12:08

## 2022-08-29 RX ADMIN — FENTANYL CITRATE 100 MCG: 50 INJECTION INTRAMUSCULAR; INTRAVENOUS at 06:08

## 2022-08-29 RX ADMIN — FENTANYL CITRATE 50 MCG: 50 INJECTION, SOLUTION INTRAMUSCULAR; INTRAVENOUS at 07:08

## 2022-08-29 RX ADMIN — FENTANYL CITRATE 50 MCG: 50 INJECTION, SOLUTION INTRAMUSCULAR; INTRAVENOUS at 10:08

## 2022-08-29 RX ADMIN — Medication 2 G: at 07:08

## 2022-08-29 RX ADMIN — FENTANYL CITRATE 50 MCG: 50 INJECTION, SOLUTION INTRAMUSCULAR; INTRAVENOUS at 11:08

## 2022-08-29 RX ADMIN — ROCURONIUM BROMIDE 50 MG: 10 INJECTION, SOLUTION INTRAVENOUS at 07:08

## 2022-08-29 RX ADMIN — MIDAZOLAM 2 MG: 1 INJECTION INTRAMUSCULAR; INTRAVENOUS at 06:08

## 2022-08-29 NOTE — NURSING TRANSFER
Nursing Transfer Note      8/29/2022     Transfer To: [facility]    Transfer via stretcher    Transported by RN    Medicines sent: bedside pharmacy delivery bag    Any special needs or follow-up needed: N/A    Chart send with patient: Yes    Notified: friend    Patient reassessed at: 8/29/2022 @ 1440    Upon arrival to floor: patient oriented to room, call bell in reach, and bed in lowest position

## 2022-08-29 NOTE — TRANSFER OF CARE
Anesthesia Transfer of Care Note    Patient: Nimco Ríos    Procedure(s) Performed: Procedure(s) (LRB):  XI ROBOTIC SALPINGO-OOPHORECTOMY (Right)  XI ROBOTIC URETEROLYSIS (Right)  ROBOTIC LYSIS, ADHESIONS    Patient location: PACU    Anesthesia Type: general    Transport from OR: Transported from OR on 6-10 L/min O2 by face mask with adequate spontaneous ventilation    Post pain: adequate analgesia    Post assessment: no apparent anesthetic complications and tolerated procedure well    Post vital signs: stable    Level of consciousness: awake    Nausea/Vomiting: no nausea/vomiting    Complications: none    Transfer of care protocol was followed      Last vitals:   Visit Vitals  /72   Pulse 88   Temp 36.2 °C (97.2 °F) (Temporal)   Resp 15   Wt 87.5 kg (193 lb)   LMP 10/23/2018 (Approximate)   SpO2 100%   Breastfeeding No   BMI 34.19 kg/m²

## 2022-08-29 NOTE — ANESTHESIA PREPROCEDURE EVALUATION
08/29/2022  Nimco Ríos is a 27 y.o., female.        Pre-operative evaluation for Procedure(s) (LRB):  XI ROBOTIC SALPINGO-OOPHORECTOMY (Right)    @[id number]@@    Encounter Diagnoses   Name Primary?    Cyst of right ovary Yes    Endometriosis        Review of patient's allergies indicates:   Allergen Reactions    Iodinated contrast media Nausea And Vomiting       Medications Prior to Admission   Medication Sig Dispense Refill Last Dose    losartan (COZAAR) 50 MG tablet Take 50 mg by mouth once daily.   8/28/2022 at Unknown time    norethindrone (AYGESTIN) 5 mg Tab Take 5 mg by mouth once daily.   Past Week at Unknown time    pantoprazole (PROTONIX) 40 MG tablet Take 40 mg by mouth daily as needed (heartburn).    8/28/2022 at Unknown time    vit C-vit D3-E-zinc-elderberry (AIRBORNE VITS ZINC ELDERBERRY) 65 mg-3.15 mcg- 3.35 mg-1 mg Chew Take 1 tablet by mouth once daily.   Past Week at Unknown time    ALOE VERA ORAL Take 1 capsule by mouth once daily.   More than a month    gabapentin (NEURONTIN) 300 MG capsule Take 1 capsule by mouth 2 (two) times daily as needed (pain).    8/15/2022    ibuprofen (ADVIL,MOTRIN) 800 MG tablet Take 1 tablet (800 mg total) by mouth 3 (three) times daily. 60 tablet 1 8/26/2022    naproxen (NAPROSYN) 500 MG tablet Take 500 mg by mouth 2 (two) times daily.               No current facility-administered medications on file prior to encounter.     Current Outpatient Medications on File Prior to Encounter   Medication Sig Dispense Refill    losartan (COZAAR) 50 MG tablet Take 50 mg by mouth once daily.      norethindrone (AYGESTIN) 5 mg Tab Take 5 mg by mouth once daily.      pantoprazole (PROTONIX) 40 MG tablet Take 40 mg by mouth daily as needed (heartburn).       vit C-vit D3-E-zinc-elderberry (AIRBORNE VITS ZINC ELDERBERRY) 65 mg-3.15 mcg- 3.35 mg-1 mg  Chew Take 1 tablet by mouth once daily.      ALOE VERA ORAL Take 1 capsule by mouth once daily.      gabapentin (NEURONTIN) 300 MG capsule Take 1 capsule by mouth 2 (two) times daily as needed (pain).       ibuprofen (ADVIL,MOTRIN) 800 MG tablet Take 1 tablet (800 mg total) by mouth 3 (three) times daily. 60 tablet 1    naproxen (NAPROSYN) 500 MG tablet Take 500 mg by mouth 2 (two) times daily.         Past Medical History:  No date: Abnormal Pap smear of cervix  No date: Chronic abdominal pain  No date: Constipation  No date: Crohn disease  No date: Endometriosis  No date: HTN (hypertension)  No date: Infertility, female  No date: Liver lesion  No date: Mental disorder  No date: Seizures      Comment:  2020  No date: UTI (urinary tract infection)      Comment:  hx of e coli in urine    Past Surgical History:   Procedure Laterality Date    APPENDECTOMY  2015    ASPIRATION OF ABSCESS N/A 10/25/2018    Procedure: ASPIRATION, ABSCESS;  [facility];  Location: [address];  Service: Anesthesiology;  Laterality: N/A;     SECTION  2009    CHOLECYSTECTOMY  2018    CYSTOSCOPY W/ URETERAL STENT PLACEMENT Right 2021    Procedure: CYSTOSCOPY, WITH URETERAL STENT INSERTION;  Surgeon: Syed Dickens MD;  Location: [facility];  Service: Urology;  Laterality: Right;    HERNIA REPAIR  2018    HYSTERECTOMY         Social History     Tobacco Use   Smoking Status Never   Smokeless Tobacco Never       Social History     Substance and Sexual Activity   Alcohol Use No       Physical Activity: Not on file         No results for input(s): HCT in the last 72 hours.  No results for input(s): PLT in the last 72 hours.  No results for input(s): K in the last 72 hours.  No results for input(s): CREATININE in the last 72 hours.  No results for input(s): GLU in the last 72 hours.  No results for input(s): PT in the last 72 hours.                      Pre-op Assessment           Review of Systems  Anesthesia Hx:  No problems with previous Anesthesia    Hematology/Oncology:  Hematology Normal   Oncology Normal     Cardiovascular:   Hypertension, well controlled Denies MI.    Denies Angina.    Pulmonary:  Pulmonary Normal  Denies COPD.  Denies Asthma.  Denies Shortness of breath.    Renal/:   Denies Chronic Renal Disease.     Hepatic/GI:   Denies Liver Disease.    Neurological:   Denies TIA. Denies CVA. Seizures (one 2009 with pregnancy, 2019 thought related to father's death.)    Endocrine:   Denies Diabetes.        Physical Exam  General: Well nourished, Cooperative, Alert and Oriented    Airway:  Mallampati: II   Mouth Opening: Normal  TM Distance: Normal  Tongue: Normal  Neck ROM: Normal ROM        Anesthesia Plan  Type of Anesthesia, risks & benefits discussed:    Anesthesia Type: Gen ETT  Intra-op Monitoring Plan: Standard ASA Monitors  Post Op Pain Control Plan: multimodal analgesia and IV/PO Opioids PRN  Induction:  IV  Informed Consent: Informed consent signed with the Patient and all parties understand the risks and agree with anesthesia plan.  All questions answered.   ASA Score: 2  Day of Surgery Review of History & Physical: H&P Update referred to the surgeon/provider.    Ready For Surgery From Anesthesia Perspective.     .   12/14/20; 0719 (created via procedure documentation); Direct laryngoscopy; Standard; 6.5 mm; Yes; Ashleigh; 3; Oral; Grade I; 1; Capnometry

## 2022-08-29 NOTE — OP NOTE
Luke Altamirano - Surgery ([facility])  Gynecologic Oncology  Operative Note    SUMMARY     Date of Procedure: 8/29/2022     Procedure: Procedure(s) (LRB):  XI ROBOTIC SALPINGO-OOPHORECTOMY (Right)       Surgeon(s) and Role:     * Freeman Galarza MD - Primary     * Tea Teran MD - Resident - Assisting        Pre-Operative Diagnosis: Endometriosis [N80.9]  Right ovarian cyst [N83.201]    Post-Operative Diagnosis: Post-Op Diagnosis Codes:     * Endometriosis [N80.9]     * Right ovarian cyst [N83.201]     * Retroperitoneal Fibrosis    Anesthesia: General    Technical Procedures Used: 1.  Robotic Assisted Right Salpingo-oophorectomy  2.  Right ureterolysis  3.  Extensive lysis of adhesions    Description of the Findings of the Procedure: The abdomen and upper abdomen were relatively free of disease and adhesions but there were endometriotic implants involving the diaphragmatic peritoneum and ligamentum teres.  The uterus, tubes, cervix, left ovary, and portion of sigmoid were surgically absent.  The pelvis was relatively free of adhesions except for the cul-de-sac and right pelvic sidewall.  The cul de sac was filled by an endometriotic cyst with bowel mesentery adherent medially, and adherent to the sidewall vessels laterally.  The ureter was dilated and above and below the pelvic brim with a tight area of stricture just distal to the common iliac.  The entire retroperitoneum was fibrosed making identification and mobilization of the ureter exceedingly difficult.  Because of this, the case took several hours longer than typical.       Procedure in Detail: After noting appropriate consents, the patient was taken to the operating room and placed in the dorsal lithotomy position. SCDs were started and GETA achieved. An EUA was performed and she was then prepped and draped in the usual sterile manner.  A menezes was inserted in a sterile manner.  A LUQ 5mm incision was made and an Optiview trocar was inserted into the abdomen  under direct visualization and pneumoperitoneum was obtained with CO2 gas insufflation.  The insertion site was checked and found to be free of injury.  Now under direct visualization an 8 mm trocar and sleeve were inserted in the midline in a supra-umbilical position. Additional right sided 8mm ports x 2 and a left sided 8mm port were placed.  Trendelenberg was obtained and bowel was moved out of the pelvis. The robot was docked in the usual manner.     We first turned our attention to opening the retroperitoneum superior to and above the ovarian cyst.  The planes were very difficult to discern due to fibrosis.  The infundibulopelvic ligament was identified and elevated and a window was created between the IP and the ureter.  The IP ligament was sealed with the bipolar grasper and divided with the monopolar andrew.  The IP was used to elevate the mass, and over the course of the next couple of hours it was carefully dissected free from the bowel mesentery laterally, the sacrum posteriorly, and the ureter and sidewall structures laterally.      Due to the distorted anatomy, Dr. Jaramillo (urology) was consulted intraoperatively to confirm the intetgrity of the proximal and distal segments of the ureter and give guidance regarding further management of the stricture point.  He advised resecting the remaining components of the endometrioma without further efforts to free the stricture due to the degree of scarring and fibrosis.      Per his guidance, we resected the residual ovary and endometrioma and removed them in a 5 mm EndoCatch bag.  A final survey was done of the abdomen and pelvis.  There was no active bleeding and FloSeal was placed on the raw surfaces where the endometrioma had been resected.  The integrity of the bowel, bladder, ureter, and other visible organs and tissues was again confirmed.      Assuring hemostasis, the decision was made to close.  The Kwesi Alissa assist device was used to close the  midline port which had been extended slightly to accommodate the 5 mm bag.  The remaining gas was vented from the abdomen and the patient was given manual breaths.  The remaining ports were removed and the skin closed with 4-0 Monocryl.  At this point all instruments were removed from the abdomen and vagina.  Sponge, lap, and needle counts were correct x 2.  The patient was awakened, extubated, and taken to the recovery room in good condition.      Significant Surgical Tasks Conducted by the Assistant(s), if Applicable: ELIZABETH Helton performed expert bedside robotic assist.  I certify that the services for which payment is claimed were medically necessary, and that no qualified resident was available to perform the services.    Complications: No    Estimated Blood Loss (EBL):  50 ml    Condition: Good    Disposition: PACU - hemodynamically stable.    Attestation: I was present and scrubbed for the entire procedure.

## 2022-08-29 NOTE — H&P
I have seen patient & no changes have occurred since H&P was written by Dr. Freeman Galarza as below. R/B/A understood by patient & all questions answered.     Tea Teran M.D.  OB/GYN PGY-4      REFERRING PROVIDER  Gisela Valenzuela MD      HISTORY OF PRESENT CONDITION  CC: Stage IV Endometriosis  Nimco Ríos is a 26 y.o.  with complicated endometriosis history.  She was referred to discuss possible RSO of complex right adnexal mass.  She has h/o hysterectomy / LSO / LAR for endometriosis.   Has 5-6cm complex cyst on right ovary that Dr. Valenzuela has been monitoring the past year. Managed on orlissa and agyestin add back therapy. Previously tried OCPs and Lupron.  She presented with worsening pain to [facility] ([facility]). Cr noted to 1.5, diagnosed with FRANTZ 2/2 to mass effect.  Had bilateral hydronephrosis and bilateral ureteral stents placed. Previously had seen Dr. Valenzuela and wasn't ready for oophrectomy, only wanted cystectomy vs IR drainage.  [see Dr. Valenzuela 2022 note for detailed history].     OF NOTE:   Patient has h/o Crohns with anal fistula in the past; She takes occasional hydrocodone for pain now;  She is a phlebotomist and MA.       DATA REVIEWED:  2018 Resection of liver capsule endometrioma at .  (per patient report)     2020 TLH / LSO / LAR:  (Axel Mendoza, GYN, Aron Mitchell, Colorectal)  FINDINGS:  Stage IV endometriosis was noted involving the cul-de-sac, bladder, and  left and right adnexa. The sigmoid colon had been previously diagnosed  with stenosis secondary to endometriosis;  [Sigmoid] Stricture at 15 cm     2021 CT AP:  There is right-sided hydronephrosis.  There is a complex cystic mass in the right side of the pelvis measuring 6.7 x 4.2 cm.  This is compressing the right ureter causing the hydronephrosis.  The appendix is mildly prominent in size measuring 7 mm there is minimal surrounding inflammation cannot rule out appendicitis with certainty.      4/21/2022 US Pelvis:  The uterus is surgically absent.  The left ovary is not seen.  In the right hemipelvis there is a complex cystic mass measuring 5.1 x 3.3 x 3.5 cm.  This is smaller and has a more hypoechoic appearance than was seen previously.  This may represent a resolving hematoma or other complex fluid collection.  There is no evidence of free fluid.  Identifiable ovarian tissue is not seen.     6/24/2022 cysto bilateral rtg pyelograms right ureteral dilation and stent:  mid right ureteral obstruction - tight with inability to get contrast through. Wire placed with difficulty, ureter dilated and 6f x 26 cm JJ stent placed on rt;  Normal left ureter and collecting system     7/4/2022CT AP:  1.  Status post placement of a double-J right ureteral stent since the previous CT with some improvement in the right-sided ureteropyelocaliectasis although there is persistent lesser right sided ureteropyelocaliectasis, with no obvious renal or ureteral calculus.  2.  Persistent, enlarged right ovary measuring at least 6.5 x 4.5 cm.  3.  Status post cholecystectomy with mild intrahepatic and extrahepatic bile duct dilatation, similar to the previous study, with no choledocholithiasis. 4.  There is a tubular appearing, fluid-filled structure in the right lower abdominal quadrant, as discussed above, of uncertain etiology. This was present on each of the two previous CT scans. It does not appear to represent a bowel loop as it appears to be somewhat blind-ending, but, according to the patient's history, the appendix has been previously resected. Correlate clinically with respect to any pain in this region. 5.  Postop findings consistent with partial previous sigmoid colon resection.           Past Medical History:   Diagnosis Date    Abnormal Pap smear of cervix      Chronic abdominal pain      Constipation      Crohn disease      Endometriosis      HTN (hypertension)      Infertility, female      Liver lesion       Mental disorder      Seizures       2020    UTI (urinary tract infection)       hx of e coli in urine           Current Outpatient Medications   Medication Sig    ALOE VERA ORAL Take 1 capsule by mouth once daily.    gabapentin (NEURONTIN) 300 MG capsule Take 1 capsule by mouth 2 (two) times daily as needed (pain).     ibuprofen (ADVIL,MOTRIN) 800 MG tablet Take 1 tablet (800 mg total) by mouth 3 (three) times daily.    naproxen (NAPROSYN) 500 MG tablet Take 500 mg by mouth 2 (two) times daily.    norethindrone (AYGESTIN) 5 mg Tab Take 5 mg by mouth once daily.    pantoprazole (PROTONIX) 40 MG tablet Take 40 mg by mouth daily as needed (heartburn).     vit C-vit D3-E-zinc-elderberry (AIRBORNE VITS ZINC ELDERBERRY) 65 mg-3.15 mcg- 3.35 mg-1 mg Chew Take 1 tablet by mouth once daily.    doxycycline (VIBRA-TABS) 100 MG tablet Take 1 tablet (100 mg total) by mouth every 12 (twelve) hours.    oxyCODONE-acetaminophen (PERCOCET) 5-325 mg per tablet Take 1 tablet by mouth every 4 (four) hours as needed for Pain.    ZTLIDO 1.8 % PtMd Apply 1 patch topically nightly as needed (pain).       No current facility-administered medications for this visit.           Review of patient's allergies indicates:   Allergen Reactions    Iodinated contrast media Nausea And Vomiting               Past Surgical History:   Procedure Laterality Date    APPENDECTOMY   2015    ASPIRATION OF ABSCESS N/A 10/25/2018     Procedure: ASPIRATION, ABSCESS;  Surgeon: Millicent Diagnostic Provider;  Location: [address];  Service: Anesthesiology;  Laterality: N/A;     SECTION   2009    CHOLECYSTECTOMY   2018    CYSTOSCOPY W/ URETERAL STENT PLACEMENT Right 2021     Procedure: CYSTOSCOPY, WITH URETERAL STENT INSERTION;  Surgeon: Syed Dickens MD;  Location: [facility];  Service: Urology;  Laterality: Right;    HERNIA REPAIR   2018    HYSTERECTOMY             FAMILY HISTORY        Family History   Problem Relation Age of  Onset    Hypertension Mother      Stroke Maternal Grandmother      Diabetes Sister      Eclampsia Sister      Breast cancer Maternal Aunt      Ovarian cancer Maternal Aunt      Ovarian cancer Maternal Aunt      Celiac disease Paternal Aunt      Cancer Neg Hx      Colon cancer Neg Hx      Miscarriages / Stillbirths Neg Hx       labor Neg Hx           SOCIAL HISTORY    reports that she has never smoked. She has never used smokeless tobacco. She reports that she does not drink alcohol and does not use drugs.     REVIEW OF SYSTEMS  Review of Systems   Constitutional: Negative for activity change, appetite change, chills, fatigue, fever and unexpected weight change.   Respiratory: Negative for cough, chest tightness and shortness of breath.    Cardiovascular: Negative for chest pain, palpitations and leg swelling.   Gastrointestinal: Positive for abdominal pain and blood in stool (rare). Negative for abdominal distention, constipation, diarrhea, nausea and vomiting.   Genitourinary: Positive for hematuria (prior to recent stent placement), pelvic pain and vaginal pain. Negative for dyspareunia, dysuria, frequency, menstrual problem, urgency, vaginal bleeding and vaginal discharge.   Musculoskeletal: Negative for arthralgias and myalgias.   Skin: Negative for color change and rash.   Neurological: Negative for dizziness, weakness and light-headedness.   Hematological: Negative for adenopathy.   Psychiatric/Behavioral: Negative for decreased concentration, dysphoric mood and sleep disturbance. The patient is not nervous/anxious.       OBJECTIVE       Vitals:     22 0901   BP: 135/72   Pulse: 86      Body mass index is 33.83 kg/m².      Physical Exam:   Constitutional: She is oriented to person, place, and time. She appears well-developed and well-nourished.    HENT:   Head: Normocephalic and atraumatic.    Eyes: Conjunctivae and EOM are normal. No scleral icterus.    Neck: No thyromegaly present.     Cardiovascular: Normal rate and regular rhythm.  Exam reveals no cyanosis and no edema.     Pulmonary/Chest: Effort normal and breath sounds normal. No respiratory distress.         Abdominal: Soft. She exhibits no distension and no mass. There is abdominal tenderness (right lower quadrant). There is no rebound and no guarding. No hernia.     Genitourinary:    Genitourinary Comments: Vulva - normal without lesions  Vagina - healthy without lesions; white discharge  Uterus - surgically absent  Cervix - surgically absent   Adnexa - limited due to tenderness on right, no masses or fullness on left, non-tender             Musculoskeletal: Moves all extremeties.      Lymphadenopathy:     She has no cervical adenopathy.    Neurological: She is alert and oriented to person, place, and time.    Skin: No rash noted. No cyanosis or erythema.    Psychiatric: She has a normal mood and affect. Judgment and thought content normal.      ECOG status: 0     LABORATORY DATA  Lab data reviewed.     RADIOLOGICAL DATA  Radiology data reviewed.     PATHOLOGY DATA  Pathology data reviewed.     ASSESSMENT    1. Endometriosis    2. 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  I concur with need for definitive surgery and recommended right salpingo-oophorectomy with right ureterolysis and resection of endometriosis.       I discussed extensively the risks, benefits, alternatives, and indications of the planned procedure to include the risk of damage to bowel, bladder, ureter, or any other abdominal or pelvic organ.  Additionally, she understands the standard surgical risks of infection, allergic reaction, bleeding possibly severe enough to require blood transfusion.  She expresses understanding, was given an opportunity to ask any questions, and now she strongly desires to proceed with planned procedure.     I had a very vilma conversation with the patient today  that in the setting of chronic pelvic pain, endometriosis, and multiple prior surgeries, that while surgery may help, there is a high likelihood that her pain may not be improved and could potentially be worsened.  She also expresses understanding that undergoing surgery subjects her to potential risks such as damage to bowel, bladder, ureter, major blood vessels or other abdominal / pelvic organs and structures with increased risk in setting of known surgeries, Crohns, endometriosis.  We specifically addressed the risk of fistula formation, bowel injury and possible need for a colostomy and or ileostomy.  Finally, the patient understands I am seeing her as a surgical consultant and that if further pain management is needed after the immediate postoperative period, that she would be cared for by her referring gynecologist and / or referred to a chronic pain clinic.     PLAN  1.  Schedule robotic assisted right salpingo-oophorectomy, ureterolysis, and resection of endometriosis and [facility]       Freeman Galarza MD

## 2022-08-29 NOTE — ANESTHESIA POSTPROCEDURE EVALUATION
Anesthesia Post Evaluation    Patient: Nimco Ríos    Procedure(s) Performed: Procedure(s) (LRB):  XI ROBOTIC SALPINGO-OOPHORECTOMY (Right)  XI ROBOTIC URETEROLYSIS (Right)  ROBOTIC LYSIS, ADHESIONS    Final Anesthesia Type: general      Patient location during evaluation: PACU  Patient participation: Yes- Able to Participate  Level of consciousness: awake and alert and oriented  Post-procedure vital signs: reviewed and stable  Pain management: pain needs to be addressed  Airway patency: patent    PONV status at discharge: No PONV  Anesthetic complications: no      Cardiovascular status: stable  Respiratory status: unassisted, spontaneous ventilation and room air  Hydration status: euvolemic  Follow-up not needed.          Vitals Value Taken Time   /79 08/29/22 1431   Temp 36.2 °C (97.2 °F) 08/29/22 1244   Pulse 79 08/29/22 1435   Resp 14 08/29/22 1435   SpO2 99 % 08/29/22 1435   Vitals shown include unvalidated device data.      No case tracking events are documented in the log.      Pain/César Score: Pain Rating Prior to Med Admin: 8 (8/29/2022  1:42 PM)  Pain Rating Post Med Admin: 0 (8/29/2022  6:35 AM)  César Score: 9 (8/29/2022  1:15 PM)

## 2022-08-29 NOTE — OPERATIVE NOTE ADDENDUM
Certification of Assistant at Surgery       Surgery Date: 8/29/2022     Participating Surgeons:  Surgeon(s) and Role:     * Freeman Galarza MD - Primary     * Tea Teran MD - Resident - Assisting    Procedures:  Procedure(s) (LRB):  XI ROBOTIC SALPINGO-OOPHORECTOMY (Right)  XI ROBOTIC URETEROLYSIS (Right)  ROBOTIC LYSIS, ADHESIONS    Assistant Surgeon's Certification of Necessity:  I understand that section 1842 (b) (6) (d) of the Social Security Act generally prohibits Medicare Part B reasonable charge payment for the services of assistants at surgery in teaching hospitals when qualified residents are available to furnish such services. I certify that the services for which payment is claimed were medically necessary, and that no qualified resident was available to perform the services. I further understand that these services are subject to post-payment review by the Medicare carrier.    Sarah Helton PA-C  08/29/2022  1:57 PM

## 2022-08-29 NOTE — ANESTHESIA PROCEDURE NOTES
Intubation    Date/Time: 8/29/2022 7:18 AM  Performed by: Bobbi Broderick CRNA  Authorized by: Kwabena Pizano Jr., MD     Intubation:     Induction:  Intravenous    Intubated:  Postinduction    Mask Ventilation:  Easy mask    Attempts:  1    Attempted By:  CRNA    Method of Intubation:  Direct    Blade:  Painting 2    Laryngeal View Grade: Grade I - full view of cords      Difficult Airway Encountered?: No      Complications:  None    Airway Device:  Oral endotracheal tube    Airway Device Size:  7.5    Style/Cuff Inflation:  Cuffed (inflated to minimal occlusive pressure)    Inflation Amount (mL):  5    Tube secured:  21    Secured at:  The lips    Placement Verified By:  Capnometry    Complicating Factors:  None    Findings Post-Intubation:  BS equal bilateral

## 2022-08-29 NOTE — ANESTHESIA PROCEDURE NOTES
Bilateral QL single shot    Patient location during procedure: pre-op   Block not for primary anesthetic.  Reason for block: at surgeon's request and post-op pain management   Post-op Pain Location: abdominal wall pain   Start time: 8/29/2022 6:31 AM  Timeout: 8/29/2022 6:30 AM   End time: 8/29/2022 6:35 AM    Staffing  Authorizing Provider: Tamir Eli MD  Performing Provider: Cordelia Guy MD    Preanesthetic Checklist  Completed: patient identified, IV checked, site marked, risks and benefits discussed, surgical consent, monitors and equipment checked, pre-op evaluation and timeout performed  Peripheral Block  Patient position: supine  Prep: ChloraPrep  Patient monitoring: cardiac monitor, heart rate, continuous pulse ox, continuous capnometry and frequent blood pressure checks  Block type: TAP  Laterality: bilateral  Injection technique: single shot  Needle  Needle type: Stimuplex   Needle gauge: 21 G  Needle length: 4 in  Needle localization: ultrasound guidance   -ultrasound image captured on disc.  Assessment  Injection assessment: negative aspiration, negative parasthesia and local visualized surrounding nerve  Paresthesia pain: none  Heart rate change: no  Slow fractionated injection: yes  Pain Tolerance: no complaints and comfortable throughout block  Medications:    Medications: bupivacaine (pf) (MARCAINE) injection 0.75% - Perineural   30 mL - 8/29/2022 6:40:00 AM    Additional Notes  VSS.  [facility] RN monitoring vitals throughout procedure.  Patient tolerated procedure well.    30 cc of 0.375% ropivacaine administered

## 2022-08-29 NOTE — PLAN OF CARE
Discharge instructions reviewed with patient and friend. Verbalizes understanding. Copies of instructions given to patient. Tolerating po fluids. Denies nausea. States pain 2/10 and tolerable. Pharmacy delivered prescriptions to bedside. Safety maintained.  Patient verbalizes readiness for discharge.

## 2022-08-29 NOTE — DISCHARGE SUMMARY
Discharge Summary  Gynecology      Admit Date: 2022    Discharge Date and Time: 2022     Attending Physician: No att. providers found    Principal Diagnoses:   Status post unilateral salpingo-oophorectomy    Active Hospital Problems    Diagnosis  POA    *Status post right salpingo-oophorectomy [Z90.721]  Not Applicable      Resolved Hospital Problems    Diagnosis Date Resolved POA    Right ovarian cyst [N83.201] 2022 Yes       Procedures:   Procedure(s) (LRB):  XI ROBOTIC SALPINGO-OOPHORECTOMY (Right)  XI ROBOTIC URETEROLYSIS (Right)  ROBOTIC LYSIS, ADHESIONS    Discharged Condition: good    Hospital Course:   Nimco Ríos is a 27 y.o. y.o.  female who presented on 2022 for the above-listed procedures for the treatment of endometriosis & right ovarian cyst. PMH is significant for endometriosis. Patient tolerated the procedure well and was admitted for post-operative care. Post-operative course was uncomplicated. Patient is in stable condition, having met all post-operative milestones. She was discharged with medications and follow up as listed below.     Consults: None    Significant Diagnostic Studies:  No results for input(s): WBC, HGB, HCT, MCV, PLT in the last 168 hours.     Treatments:   1. Surgery as above    Disposition: Home or Self Care    Patient Instructions:   Discharge Medication List as of 2022  3:12 PM        START taking these medications    Details   acetaminophen (TYLENOL) 500 MG tablet Take 1 tablet (500 mg total) by mouth every 6 (six) hours. Alternate with ibuprofen, Starting 2022, Normal      !! ibuprofen (ADVIL,MOTRIN) 800 MG tablet Take 1 tablet (800 mg total) by mouth every 6 (six) hours. Alternate with tylenol, Starting 2022, Normal      oxyCODONE (ROXICODONE) 5 MG immediate release tablet Take 1 tablet (5 mg total) by mouth every 4 (four) hours as needed for Pain., Starting 2022, Normal      SENNA 8.6 mg tablet Take 1  tablet by mouth 2 (two) times a day. While on narcotics., Starting Mon 8/29/2022, Normal       !! - Potential duplicate medications found. Please discuss with provider.        CONTINUE these medications which have NOT CHANGED    Details   losartan (COZAAR) 50 MG tablet Take 50 mg by mouth once daily., Historical Med      pantoprazole (PROTONIX) 40 MG tablet Take 40 mg by mouth daily as needed (heartburn). , Starting Wed 7/28/2021, Historical Med      vit C-vit D3-E-zinc-elderberry (AIRBORNE VITS ZINC ELDERBERRY) 65 mg-3.15 mcg- 3.35 mg-1 mg Chew Take 1 tablet by mouth once daily., Historical Med      ALOE VERA ORAL Take 1 capsule by mouth once daily., Historical Med      gabapentin (NEURONTIN) 300 MG capsule Take 1 capsule by mouth 2 (two) times daily as needed (pain). , Starting Mon 6/14/2021, Historical Med      !! ibuprofen (ADVIL,MOTRIN) 800 MG tablet Take 1 tablet (800 mg total) by mouth 3 (three) times daily., Starting Thu 11/4/2021, Normal       !! - Potential duplicate medications found. Please discuss with provider.        STOP taking these medications       norethindrone (AYGESTIN) 5 mg Tab Comments:   Reason for Stopping:         naproxen (NAPROSYN) 500 MG tablet Comments:   Reason for Stopping:               Discharge Procedure Orders   Diet general     Lifting restrictions   Order Comments: No lifting greater than 15 pounds for six weeks.     Other restrictions (specify):   Order Comments: PELVIC REST:  No douching, tampons, or intercourse for 6 weeks.    If prescribed, vaginal estrogen cream may be used during the postoperative period.     DRIVING:  No driving while on narcotics. Driving may be resumed initially with a competent passenger one to two weeks after surgery if no longer taking narcotics.     EXERCISE:  For six weeks your exercise should be limited to walking. You may walk as far as you wish, as long as you increase your level of exertion gradually and avoid slippery surfaces. You may climb  stairs as needed to get around, but should not use stair climbing for exercise.     Remove dressing in 24 hours   Order Comments: If you have a bandage on wound, you may remove it the day after dismissal.  If you had steri-strips remove them once they begin to peel off (usually 2 weeks). Keep incision clean and dry.  Inspect the incision daily for signs and symptoms of infection.     Wound care routine (specify)   Order Comments: WOUND CARE:  If you have a band-aid or bandage on your wound, you may remove it the day after dismissal.  You may wash the wound with mild soap and water.   You may shower at any time but should avoid immersing any abdominal incisions in water for at least two weeks after surgery or until the wound is completely healed. If given, please shower with Hibiclens soap until bottle is completely finished. Keep your wound clean and dry.  You should observe your incision for signs of infection which include redness, warmth, drainage or fever.     Call MD for:  temperature >100.4     Call MD for:  persistent nausea and vomiting     Call MD for:  severe uncontrolled pain     Call MD for:  difficulty breathing, headache or visual disturbances     Call MD for:  redness, tenderness, or signs of infection (pain, swelling, redness, odor or green/yellow discharge around incision site)     Call MD for:  hives     Call MD for:   Order Comments: inability to void,urine is ketchup colored or you have large clots, vaginal bleeding is heavier than a period.    VAGINAL DISCHARGE: You may develop a vaginal discharge and intermittent vaginal spotting after surgery and up to 6 weeks postoperatively.  The discharge may have an odor and may change in color but it is normal.  This is due to dissolving stiches.  Contact your surgical team if you develop vaginal or vulvar irritation along with a discharge.  Also contact your surgical team if you have vaginal discharge that smells like urine or stool.    CONSTIPATION  REMEDIES: Patients are often constipated after surgery or with use of oral narcotic medicine. You should continue to take the stool softener, Senokot-S during the next six weeks, and consume adequate amounts of water.  If you have not had a bowel movement for 3 days after dismissal, or are uncomfortable and unable to pass stool, please try one or all of the following measures:  1.  Milk of Magnesia - 30 cc by mouth every 12 hours   2.  Dulcolax suppository - One suppository per rectum every 4-6 hours   3.  Metamucil, Fibercon or other bulk former - use as directed  4.  Fleets Enema        PAIN MEDICATIONS:     Take your pain medications as instructed. It is best to take pain medications before your pain becomes severe. This will allow you to take less medication yet have better pain relief. For the first 2 or 3 days it may be helpful to take your pain medications on a regular schedule (e.g. every 4 to 6 hours). This will help you to keep your pain under better control. You should then begin to take fewer medications each day until you no longer need them. Do not take pain medication on an empty stomach. This may lead to nausea and vomiting.     Activity as tolerated   Order Comments: Return to normal activity slowly as you feel able.  For 6 weeks your exercise should be limited to walking.  You may walk as far as you wish, as long as you increase your level of exertion gradually and avoid slippery surfaces.    If you had a hysterectomy at the surgery do not insert anything in your vagina for 9 weeks.     Shower on day dressing removed (No bath)   Order Comments: You may shower at any time but should avoid immersing any abdominal incisions in water for at least 2 weeks after surgery or until the wound is completely healed.  If given, please shower with Hibaclens soap until bottle is completely finish        Follow-up Information       Freeman Galarza MD Follow up in 4 week(s).    Specialty: Gynecologic Oncology  Why:  post operative follow up  Contact information:  [address] [address]  [phone]                             Tea Teran M.D.  OB/GYN PGY-4

## 2022-08-31 ENCOUNTER — TELEPHONE (OUTPATIENT)
Dept: GYNECOLOGIC ONCOLOGY | Facility: CLINIC | Age: 27
End: 2022-08-31
Payer: MEDICAID

## 2022-09-02 ENCOUNTER — PATIENT MESSAGE (OUTPATIENT)
Dept: GYNECOLOGIC ONCOLOGY | Facility: CLINIC | Age: 27
End: 2022-09-02
Payer: MEDICAID

## 2022-09-08 LAB
FINAL PATHOLOGIC DIAGNOSIS: NORMAL
GROSS: NORMAL
Lab: NORMAL

## 2022-09-27 ENCOUNTER — TELEPHONE (OUTPATIENT)
Dept: GYNECOLOGIC ONCOLOGY | Facility: CLINIC | Age: 27
End: 2022-09-27
Payer: MEDICAID

## 2022-09-27 NOTE — TELEPHONE ENCOUNTER
"Return call left voicemail to call back with dates and time to reschedule ----- Message from Clover Gould MA sent at 9/27/2022  1:08 PM CDT -----    ----- Message -----  From: Cyrus Orellana  Sent: 9/27/2022  12:36 PM CDT  To: Geovanni CASTELLANOS Staff    Reschedule Existing Appointment        Appt Date: 9/26    Type of appt: Post-op    Physician: Geovanni    Reason for rescheduling? Transportation issues; Requesting to r/s for soonest available    Caller: Self    Contact Preference: [phone]             Additional Information:  "Thank you for all that you do for our patients"       "

## 2022-10-04 ENCOUNTER — TELEPHONE (OUTPATIENT)
Dept: GYNECOLOGIC ONCOLOGY | Facility: CLINIC | Age: 27
End: 2022-10-04
Payer: MEDICAID

## 2022-10-04 NOTE — TELEPHONE ENCOUNTER
----- Message from Randall Moat MA sent at 9/27/2022  2:08 PM CDT -----  Return call left voicemail to call back with dates and time to reschedule --

## 2022-10-04 NOTE — TELEPHONE ENCOUNTER
Spoke with our patient about her insurance, reschedule appointment she voiced understanding of the date, time and location. All questions answered appointment mail. Provider Scheduling Coord.  Gynecologic Oncology MA/PAR /Preceptor Randall Mota

## 2022-10-24 ENCOUNTER — OFFICE VISIT (OUTPATIENT)
Dept: GYNECOLOGIC ONCOLOGY | Facility: CLINIC | Age: 27
End: 2022-10-24
Payer: MEDICAID

## 2022-10-24 VITALS
DIASTOLIC BLOOD PRESSURE: 83 MMHG | BODY MASS INDEX: 36.24 KG/M2 | HEART RATE: 75 BPM | WEIGHT: 204.56 LBS | SYSTOLIC BLOOD PRESSURE: 133 MMHG

## 2022-10-24 DIAGNOSIS — N83.201 RIGHT OVARIAN CYST: ICD-10-CM

## 2022-10-24 DIAGNOSIS — N80.9 ENDOMETRIOSIS: Primary | ICD-10-CM

## 2022-10-24 PROCEDURE — 4010F ACE/ARB THERAPY RXD/TAKEN: CPT | Mod: CPTII,,, | Performed by: OBSTETRICS & GYNECOLOGY

## 2022-10-24 PROCEDURE — 4010F PR ACE/ARB THEARPY RXD/TAKEN: ICD-10-PCS | Mod: CPTII,,, | Performed by: OBSTETRICS & GYNECOLOGY

## 2022-10-24 PROCEDURE — 99024 POSTOP FOLLOW-UP VISIT: CPT | Mod: ,,, | Performed by: OBSTETRICS & GYNECOLOGY

## 2022-10-24 PROCEDURE — 1159F PR MEDICATION LIST DOCUMENTED IN MEDICAL RECORD: ICD-10-PCS | Mod: CPTII,,, | Performed by: OBSTETRICS & GYNECOLOGY

## 2022-10-24 PROCEDURE — 99213 OFFICE O/P EST LOW 20 MIN: CPT | Mod: PBBFAC | Performed by: OBSTETRICS & GYNECOLOGY

## 2022-10-24 PROCEDURE — 3079F DIAST BP 80-89 MM HG: CPT | Mod: CPTII,,, | Performed by: OBSTETRICS & GYNECOLOGY

## 2022-10-24 PROCEDURE — 3075F SYST BP GE 130 - 139MM HG: CPT | Mod: CPTII,,, | Performed by: OBSTETRICS & GYNECOLOGY

## 2022-10-24 PROCEDURE — 99999 PR PBB SHADOW E&M-EST. PATIENT-LVL III: ICD-10-PCS | Mod: PBBFAC,,, | Performed by: OBSTETRICS & GYNECOLOGY

## 2022-10-24 PROCEDURE — 3079F PR MOST RECENT DIASTOLIC BLOOD PRESSURE 80-89 MM HG: ICD-10-PCS | Mod: CPTII,,, | Performed by: OBSTETRICS & GYNECOLOGY

## 2022-10-24 PROCEDURE — 3075F PR MOST RECENT SYSTOLIC BLOOD PRESS GE 130-139MM HG: ICD-10-PCS | Mod: CPTII,,, | Performed by: OBSTETRICS & GYNECOLOGY

## 2022-10-24 PROCEDURE — 99999 PR PBB SHADOW E&M-EST. PATIENT-LVL III: CPT | Mod: PBBFAC,,, | Performed by: OBSTETRICS & GYNECOLOGY

## 2022-10-24 PROCEDURE — 99024 PR POST-OP FOLLOW-UP VISIT: ICD-10-PCS | Mod: ,,, | Performed by: OBSTETRICS & GYNECOLOGY

## 2022-10-24 PROCEDURE — 1159F MED LIST DOCD IN RCRD: CPT | Mod: CPTII,,, | Performed by: OBSTETRICS & GYNECOLOGY

## 2022-10-24 NOTE — PROGRESS NOTES
REFERRING PROVIDER  Gisela Valenzuela MD     INTERVAL HISTORY  CC: postop follow-up  Nimco Ríos is a 27 y.o.  s/p robotic assisted right salpingo-oophorectomy, right ureterolysis, and extensive lysis of adhesions on 2022.  She has had significant hot flashes and night sweats since surgery but otherwise feels much better.  She has no vaginal bleeding or discharge.  She is having no nausea or vomiting.  She has no bowel or bladder concerns.  She has no pain issues.  She is resuming her normal activities.    She has a follow-up later this week with [facility].    2022 Robotic Assisted  Right Salpingo-oophorectomy, Right ureterolysis, Lysis of Adhesion  FINDINGS:  The abdomen and upper abdomen were relatively free of disease and adhesions but there were endometriotic implants involving the diaphragmatic peritoneum and ligamentum teres.  The uterus, tubes, cervix, left ovary, and portion of sigmoid were surgically absent.  The pelvis was relatively free of adhesions except for the cul-de-sac and right pelvic sidewall.  The cul de sac was filled by an endometriotic cyst with bowel mesentery adherent medially, and adherent to the sidewall vessels laterally.  The ureter was dilated and above and below the pelvic brim with a tight area of stricture just distal to the common iliac.  The entire retroperitoneum was fibrosed making identification and mobilization of the ureter exceedingly difficult.    PATHOLOGY:  Right ovary with hemorrhagic follicle cyst and endometriosis.    HPI or ONCOLOGIC HISTORY  Ms. Ríos has complicated endometriosis history.  She was referred to discuss possible RSO of complex right adnexal mass.  She has h/o hysterectomy / LSO / LAR for endometriosis.   Has 5-6cm complex cyst on right ovary that Dr. Valenzuela has been monitoring the past year. Managed on orlissa and agyestin add back therapy. Previously tried OCPs and Lupron.  She presented with worsening pain to [facility] ([facility]). Cr noted to 1.5, diagnosed with FRANTZ 2/2 to mass effect.  Had bilateral hydronephrosis and bilateral ureteral stents placed. Previously had seen Dr. Valenzuela and wasn't ready for oophrectomy, only wanted cystectomy vs IR drainage.  [see Dr. Valenzuela 6/27/2022 note for detailed history].     OF NOTE:   Patient has h/o Crohns with anal fistula in the past; She takes occasional hydrocodone for pain now;  She is a phlebotomist and MA.       DATA REVIEWED:  2018 Resection of liver capsule endometrioma at .  (per patient report)     12/14/2020 TLH / LSO / LAR:  (Axel Mendoza, GYN, Aron Mitchell, Colorectal)  FINDINGS:  Stage IV endometriosis was noted involving the cul-de-sac, bladder, and  left and right adnexa. The sigmoid colon had been previously diagnosed  with stenosis secondary to endometriosis;  [Sigmoid] Stricture at 15 cm     9/12/2021 CT AP:  There is right-sided hydronephrosis.  There is a complex cystic mass in the right side of the pelvis measuring 6.7 x 4.2 cm.  This is compressing the right ureter causing the hydronephrosis.  The appendix is mildly prominent in size measuring 7 mm there is minimal surrounding inflammation cannot rule out appendicitis with certainty.     4/21/2022 US Pelvis:  The uterus is surgically absent.  The left ovary is not seen.  In the right hemipelvis there is a complex cystic mass measuring 5.1 x 3.3 x 3.5 cm.  This is smaller and has a more hypoechoic appearance than was seen previously.  This may represent a resolving hematoma or other complex fluid collection.  There is no evidence of free fluid.  Identifiable ovarian tissue is not seen.     6/24/2022 cysto bilateral rtg pyelograms right ureteral dilation and stent:  mid right ureteral obstruction - tight with inability to get contrast through. Wire placed with difficulty, ureter dilated and 6f x 26 cm JJ stent placed on rt;  Normal left ureter and collecting system     7/4/2022CT AP:  1.  Status post placement  of a double-J right ureteral stent since the previous CT with some improvement in the right-sided ureteropyelocaliectasis although there is persistent lesser right sided ureteropyelocaliectasis, with no obvious renal or ureteral calculus.  2.  Persistent, enlarged right ovary measuring at least 6.5 x 4.5 cm.  3.  Status post cholecystectomy with mild intrahepatic and extrahepatic bile duct dilatation, similar to the previous study, with no choledocholithiasis. 4.  There is a tubular appearing, fluid-filled structure in the right lower abdominal quadrant, as discussed above, of uncertain etiology. This was present on each of the two previous CT scans. It does not appear to represent a bowel loop as it appears to be somewhat blind-ending, but, according to the patient's history, the appendix has been previously resected. Correlate clinically with respect to any pain in this region. 5.  Postop findings consistent with partial previous sigmoid colon resection.    OBJECTIVE   Vitals:    10/24/22 1340   BP: 133/83   Pulse: 75      Body mass index is 36.24 kg/m².     Physical Exam:   Constitutional: She is oriented to person, place, and time. She appears well-developed and well-nourished. No distress.    HENT:   Head: Normocephalic and atraumatic.    Eyes: Conjunctivae and EOM are normal.      Pulmonary/Chest: Effort normal. No respiratory distress.        Abdominal: Soft. She exhibits abdominal incision (well healed). She exhibits no distension and no mass. There is no abdominal tenderness. There is no rebound and no guarding. No hernia.                 Neurological: She is alert and oriented to person, place, and time.    Skin: No rash noted.    Psychiatric: She has a normal mood and affect. Her behavior is normal.   ECOG status: 0    LABORATORY DATA  Lab data reviewed.    RADIOLOGICAL DATA  Radiology data reviewed.    PATHOLOGY DATA  Pathology data reviewed.    ASSESSMENT    1. Endometriosis    2. Right ovarian cyst      Patient doing well postoperatively except for predictable menopausal symptoms. She would benefit from hormone therapy and she will make a follow-up with Dr. Valenzuela to discuss this.    Though I performed extensive ureterolysis during her oophorectomy, I am not confident that her stricture is completely relieved and she may need ongoing urologic follow-up.  I had Dr. Lenin Jaramillo ([facility]) consult intraoperatively and he did not recommend further ureterolysis at the time of my surgery out of concern of ureteral compromise with a challenging  reconstruction scenario should that occur.  With her endometriosis treated, it may be prudent for her to have close interval urologic surveillance and if her stent is removed, recognize the potential need for future urologic intervention.     PLAN   Follow-up with Dr. Valenzuela further Gyn care and consideration of hormone therapy  Follow-up with Dr. Hall, [facility] for ongoing urologic management          Freeman Galarza MD

## 2022-12-05 ENCOUNTER — OFFICE VISIT (OUTPATIENT)
Dept: OBSTETRICS AND GYNECOLOGY | Facility: CLINIC | Age: 27
End: 2022-12-05
Payer: MEDICAID

## 2022-12-05 VITALS
SYSTOLIC BLOOD PRESSURE: 122 MMHG | WEIGHT: 216.94 LBS | HEIGHT: 63 IN | BODY MASS INDEX: 38.44 KG/M2 | DIASTOLIC BLOOD PRESSURE: 74 MMHG | RESPIRATION RATE: 16 BRPM

## 2022-12-05 DIAGNOSIS — N80.9 ENDOMETRIOSIS: ICD-10-CM

## 2022-12-05 DIAGNOSIS — N95.1 MENOPAUSAL SYMPTOMS: Primary | ICD-10-CM

## 2022-12-05 PROCEDURE — 3074F PR MOST RECENT SYSTOLIC BLOOD PRESSURE < 130 MM HG: ICD-10-PCS | Mod: CPTII,,, | Performed by: STUDENT IN AN ORGANIZED HEALTH CARE EDUCATION/TRAINING PROGRAM

## 2022-12-05 PROCEDURE — 99999 PR PBB SHADOW E&M-EST. PATIENT-LVL III: ICD-10-PCS | Mod: PBBFAC,,, | Performed by: STUDENT IN AN ORGANIZED HEALTH CARE EDUCATION/TRAINING PROGRAM

## 2022-12-05 PROCEDURE — 1159F MED LIST DOCD IN RCRD: CPT | Mod: CPTII,,, | Performed by: STUDENT IN AN ORGANIZED HEALTH CARE EDUCATION/TRAINING PROGRAM

## 2022-12-05 PROCEDURE — 3078F PR MOST RECENT DIASTOLIC BLOOD PRESSURE < 80 MM HG: ICD-10-PCS | Mod: CPTII,,, | Performed by: STUDENT IN AN ORGANIZED HEALTH CARE EDUCATION/TRAINING PROGRAM

## 2022-12-05 PROCEDURE — 99213 OFFICE O/P EST LOW 20 MIN: CPT | Mod: S$PBB,,, | Performed by: STUDENT IN AN ORGANIZED HEALTH CARE EDUCATION/TRAINING PROGRAM

## 2022-12-05 PROCEDURE — 3008F BODY MASS INDEX DOCD: CPT | Mod: CPTII,,, | Performed by: STUDENT IN AN ORGANIZED HEALTH CARE EDUCATION/TRAINING PROGRAM

## 2022-12-05 PROCEDURE — 3074F SYST BP LT 130 MM HG: CPT | Mod: CPTII,,, | Performed by: STUDENT IN AN ORGANIZED HEALTH CARE EDUCATION/TRAINING PROGRAM

## 2022-12-05 PROCEDURE — 3008F PR BODY MASS INDEX (BMI) DOCUMENTED: ICD-10-PCS | Mod: CPTII,,, | Performed by: STUDENT IN AN ORGANIZED HEALTH CARE EDUCATION/TRAINING PROGRAM

## 2022-12-05 PROCEDURE — 99213 OFFICE O/P EST LOW 20 MIN: CPT | Mod: PBBFAC,PN | Performed by: STUDENT IN AN ORGANIZED HEALTH CARE EDUCATION/TRAINING PROGRAM

## 2022-12-05 PROCEDURE — 4010F PR ACE/ARB THEARPY RXD/TAKEN: ICD-10-PCS | Mod: CPTII,,, | Performed by: STUDENT IN AN ORGANIZED HEALTH CARE EDUCATION/TRAINING PROGRAM

## 2022-12-05 PROCEDURE — 99213 PR OFFICE/OUTPT VISIT, EST, LEVL III, 20-29 MIN: ICD-10-PCS | Mod: S$PBB,,, | Performed by: STUDENT IN AN ORGANIZED HEALTH CARE EDUCATION/TRAINING PROGRAM

## 2022-12-05 PROCEDURE — 1159F PR MEDICATION LIST DOCUMENTED IN MEDICAL RECORD: ICD-10-PCS | Mod: CPTII,,, | Performed by: STUDENT IN AN ORGANIZED HEALTH CARE EDUCATION/TRAINING PROGRAM

## 2022-12-05 PROCEDURE — 3078F DIAST BP <80 MM HG: CPT | Mod: CPTII,,, | Performed by: STUDENT IN AN ORGANIZED HEALTH CARE EDUCATION/TRAINING PROGRAM

## 2022-12-05 PROCEDURE — 99999 PR PBB SHADOW E&M-EST. PATIENT-LVL III: CPT | Mod: PBBFAC,,, | Performed by: STUDENT IN AN ORGANIZED HEALTH CARE EDUCATION/TRAINING PROGRAM

## 2022-12-05 PROCEDURE — 4010F ACE/ARB THERAPY RXD/TAKEN: CPT | Mod: CPTII,,, | Performed by: STUDENT IN AN ORGANIZED HEALTH CARE EDUCATION/TRAINING PROGRAM

## 2022-12-05 RX ORDER — CONJUGATED ESTROGENS 0.62 MG/G
CREAM VAGINAL
Qty: 45 G | Refills: 12 | Status: SHIPPED | OUTPATIENT
Start: 2022-12-05

## 2022-12-05 RX ORDER — NORETHINDRONE ACETATE AND ETHINYL ESTRADIOL 1MG-20(24)
1 KIT ORAL DAILY
Qty: 90 TABLET | Refills: 4 | Status: SHIPPED | OUTPATIENT
Start: 2022-12-05 | End: 2023-12-05

## 2022-12-05 NOTE — PROGRESS NOTES
History & Physical  Gynecology      SUBJECTIVE:     Chief Complaint: Discuss HRT       History of Present Illness:    Here today to discuss HRT. S/p robotic RSO for endometriosis. Now s/p hyst/BSO. Doing well from surgery standpoint. Having hot flashes and vaginal dryness.       Review of patient's allergies indicates:   Allergen Reactions    Iodinated contrast media Nausea And Vomiting       Past Medical History:   Diagnosis Date    Abnormal Pap smear of cervix     Chronic abdominal pain     Constipation     Crohn disease     Endometriosis     HTN (hypertension)     Infertility, female     Liver lesion     Mental disorder     Seizures     2020    UTI (urinary tract infection)     hx of e coli in urine     Past Surgical History:   Procedure Laterality Date    APPENDECTOMY  2015    ASPIRATION OF ABSCESS N/A 10/25/2018    Procedure: ASPIRATION, ABSCESS;  Surgeon: Millicent Diagnostic Provider;  Location: [facility] OR [address] [place] [address];  Service: Anesthesiology;  Laterality: N/A;     SECTION  2009    CHOLECYSTECTOMY  2018    CYSTOSCOPY W/ URETERAL STENT PLACEMENT Right 2021    Procedure: CYSTOSCOPY, WITH URETERAL STENT INSERTION;  Surgeon: Syed Dickens MD;  Location: [facility];  Service: Urology;  Laterality: Right;    HERNIA REPAIR  2018    HYSTERECTOMY      ROBOT-ASSISTED LAPAROSCOPIC SALPINGO-OOPHORECTOMY USING DA ESTEPHANIA XI Right 2022    Procedure: XI ROBOTIC SALPINGO-OOPHORECTOMY;  Surgeon: Freeman Galarza MD;  Location: [address];  Service: OB/GYN;  Laterality: Right;  patient will also need utereolysis    ROBOT-ASSISTED LYSIS OF ADHESIONS  2022    Procedure: ROBOTIC LYSIS, ADHESIONS;  Surgeon: Freeman Galarza MD;  Location: [address];  Service: OB/GYN;;    XI ROBOTIC URETEROLYSIS Right 2022    Procedure: XI ROBOTIC URETEROLYSIS;  Surgeon: Freeman Galarza MD;  Location: [address];  Service: OB/GYN;  Laterality: Right;     OB History          1    Para    1    Term   1            AB        Living   1         SAB        IAB        Ectopic        Multiple        Live Births   1               Family History   Problem Relation Age of Onset    Hypertension Mother     Stroke Maternal Grandmother     Diabetes Sister     Eclampsia Sister     Breast cancer Maternal Aunt     Ovarian cancer Maternal Aunt     Ovarian cancer Maternal Aunt     Celiac disease Paternal Aunt     Cancer Neg Hx     Colon cancer Neg Hx     Miscarriages / Stillbirths Neg Hx      labor Neg Hx      Social History     Tobacco Use    Smoking status: Never    Smokeless tobacco: Never   Substance Use Topics    Alcohol use: No    Drug use: No       Current Outpatient Medications   Medication Sig    acetaminophen (TYLENOL) 500 MG tablet Take 1 tablet (500 mg total) by mouth every 6 (six) hours. Alternate with ibuprofen    ALOE VERA ORAL Take 1 capsule by mouth once daily.    conjugated estrogens (PREMARIN) vaginal cream Place a pea-sized amount in vagina every night for 2 weeks, then use 2-3 nights a week    ibuprofen (ADVIL,MOTRIN) 800 MG tablet Take 1 tablet (800 mg total) by mouth every 6 (six) hours. Alternate with tylenol    losartan (COZAAR) 50 MG tablet Take 50 mg by mouth once daily.    norethindrone-e.estradioL-iron (BLISOVI 24 FE) 1 mg-20 mcg (24)/75 mg (4) per tablet Take 1 tablet by mouth once daily.    oxyCODONE (ROXICODONE) 5 MG immediate release tablet Take 1 tablet (5 mg total) by mouth every 4 (four) hours as needed for Pain.    pantoprazole (PROTONIX) 40 MG tablet Take 40 mg by mouth daily as needed (heartburn).     SENNA 8.6 mg tablet Take 1 tablet by mouth 2 (two) times a day. While on narcotics.    vit C-vit D3-E-zinc-elderberry (AIRBORNE VITS ZINC ELDERBERRY) 65 mg-3.15 mcg- 3.35 mg-1 mg Chew Take 1 tablet by mouth once daily.     No current facility-administered medications for this visit.         Review of Systems:  Review of Systems   Constitutional:  Negative for chills,  fatigue and fever.   HENT:  Negative for congestion.    Eyes:  Negative for visual disturbance.   Respiratory:  Negative for cough and shortness of breath.    Cardiovascular:  Negative for chest pain and palpitations.   Gastrointestinal:  Negative for abdominal distention, abdominal pain, constipation, diarrhea, nausea and vomiting.   Genitourinary:  Negative for difficulty urinating, dysuria, hematuria, vaginal bleeding and vaginal discharge.   Skin:  Negative for rash.   Neurological:  Negative for dizziness, seizures, light-headedness and headaches.   Hematological:  Does not bruise/bleed easily.   Psychiatric/Behavioral:  Negative for dysphoric mood. The patient is not nervous/anxious.       OBJECTIVE:     Physical Exam:  Physical Exam  Vitals reviewed.   Constitutional:       General: She is not in acute distress.     Appearance: Normal appearance. She is well-developed.   HENT:      Head: Normocephalic and atraumatic.   Cardiovascular:      Rate and Rhythm: Normal rate and regular rhythm.   Pulmonary:      Effort: Pulmonary effort is normal.   Abdominal:      General: There is no distension.      Palpations: Abdomen is soft.   Genitourinary:     Vagina: Normal.   Skin:     General: Skin is warm.   Neurological:      Mental Status: She is alert and oriented to person, place, and time.   Psychiatric:         Behavior: Behavior normal.         Thought Content: Thought content normal.         Judgment: Judgment normal.         ASSESSMENT:       ICD-10-CM ICD-9-CM    1. Menopausal symptoms  N95.1 627.2       2. Endometriosis  N80.9 617.9           No orders of the defined types were placed in this encounter.          Plan:   - discussed usual doses of HRT, however given her age will do low dose OCP in hopes of better heart and bone estrogen protection  - OCP rx called in, f/u in 3 months to determine if working. Also discussed non hormonal options  - vaginal estrogen called in    Gisela Valenzuela M.D.  Obstetrics  and Gynecology

## 2023-01-13 ENCOUNTER — PATIENT MESSAGE (OUTPATIENT)
Dept: OBSTETRICS AND GYNECOLOGY | Facility: CLINIC | Age: 28
End: 2023-01-13
Payer: MEDICAID

## 2023-01-16 RX ORDER — FLUCONAZOLE 150 MG/1
150 TABLET ORAL ONCE
Qty: 1 TABLET | Refills: 1 | Status: SHIPPED | OUTPATIENT
Start: 2023-01-16 | End: 2023-01-16

## 2023-01-17 ENCOUNTER — PATIENT MESSAGE (OUTPATIENT)
Dept: OBSTETRICS AND GYNECOLOGY | Facility: CLINIC | Age: 28
End: 2023-01-17
Payer: MEDICAID

## 2023-04-11 ENCOUNTER — PATIENT MESSAGE (OUTPATIENT)
Dept: RESEARCH | Facility: HOSPITAL | Age: 28
End: 2023-04-11
Payer: MEDICAID

## (undated) DEVICE — DRAPE ABDOMINAL TIBURON 14X11

## (undated) DEVICE — GLOVE BIOGEL SKINSENSE PI 7.0

## (undated) DEVICE — PAD PINK TRENDELENBURG POS XL

## (undated) DEVICE — SOL ELECTROLUBE ANTI-STIC

## (undated) DEVICE — SOL WATER STRL IRR 1000ML

## (undated) DEVICE — OCCLUDER COLPO-PNEUMO STERILE

## (undated) DEVICE — SEAL UNIVERSAL 5MM-8MM XI

## (undated) DEVICE — OBTURATOR BLADELESS 8MM XI CLR

## (undated) DEVICE — GOWN NONREINF SET-IN SLV XL

## (undated) DEVICE — SET CYSTO IRRIGATION UNIV SPIK

## (undated) DEVICE — TOWEL OR DISP STRL BLUE 4/PK

## (undated) DEVICE — ADHESIVE DERMABOND ADVANCED

## (undated) DEVICE — SUT V-LOC ABSRB WOUND CLSR

## (undated) DEVICE — IRRIGATOR ENDOSCOPY DISP.

## (undated) DEVICE — TRAY MINOR GEN SURG

## (undated) DEVICE — DRAPE COLUMN DAVINCI XI

## (undated) DEVICE — TRAY SKIN SCRUB WET PREMIUM

## (undated) DEVICE — SET TRI-LUMEN FILTERED TUBE

## (undated) DEVICE — DRAPE ARM DAVINCI XI

## (undated) DEVICE — ELECTRODE REM PLYHSV RETURN 9

## (undated) DEVICE — COVER TIP CURVED SCISSORS XI

## (undated) DEVICE — DRAPE UINDERBUT GRAD PCH

## (undated) DEVICE — KIT SURGIFLO HEMOSTATIC MATRIX

## (undated) DEVICE — SOL CLEARIFY VISUALIZATION LAP

## (undated) DEVICE — SUT MCRYL PLUS 4-0 PS2 27IN

## (undated) DEVICE — UNDERGLOVES BIOGEL PI SZ 7 LF

## (undated) DEVICE — TROCAR ENDOPATH XCEL 5X100MM

## (undated) DEVICE — DRAPE SCOPE PILLOW WARMER

## (undated) DEVICE — SUT VICRYL PLUS 0 CT1 36IN

## (undated) DEVICE — DRAPE LEGGINGS CUFF 33X51IN

## (undated) DEVICE — SYR 10CC LUER LOCK

## (undated) DEVICE — CATH KIT FOLEY 18FR W/URIN

## (undated) DEVICE — SOL NS 1000CC

## (undated) DEVICE — NDL HYPO REG 25G X 1 1/2